# Patient Record
Sex: FEMALE | Race: WHITE
[De-identification: names, ages, dates, MRNs, and addresses within clinical notes are randomized per-mention and may not be internally consistent; named-entity substitution may affect disease eponyms.]

---

## 2017-12-29 ENCOUNTER — HOSPITAL ENCOUNTER (EMERGENCY)
Age: 47
Discharge: HOME | End: 2017-12-29
Payer: COMMERCIAL

## 2017-12-29 DIAGNOSIS — Z86.69: ICD-10-CM

## 2017-12-29 DIAGNOSIS — R51: Primary | ICD-10-CM

## 2017-12-29 PROCEDURE — 96372 THER/PROPH/DIAG INJ SC/IM: CPT

## 2017-12-29 PROCEDURE — 70450 CT HEAD/BRAIN W/O DYE: CPT

## 2017-12-29 PROCEDURE — 99284 EMERGENCY DEPT VISIT MOD MDM: CPT

## 2018-01-13 ENCOUNTER — HOSPITAL ENCOUNTER (OUTPATIENT)
Age: 48
End: 2018-01-13
Payer: COMMERCIAL

## 2018-01-13 DIAGNOSIS — R63.1: ICD-10-CM

## 2018-01-13 DIAGNOSIS — R23.2: Primary | ICD-10-CM

## 2018-01-13 LAB
HBA1C MFR BLD: 6 % (ref 0–7)
TSH SERPL-ACNC: 1.58 UIU/ML (ref 0.36–3.74)

## 2018-01-13 PROCEDURE — 84443 ASSAY THYROID STIM HORMONE: CPT

## 2018-01-13 PROCEDURE — 36415 COLL VENOUS BLD VENIPUNCTURE: CPT

## 2018-01-13 PROCEDURE — 83036 HEMOGLOBIN GLYCOSYLATED A1C: CPT

## 2018-01-13 PROCEDURE — 83002 ASSAY OF GONADOTROPIN (LH): CPT

## 2018-01-13 PROCEDURE — 83001 ASSAY OF GONADOTROPIN (FSH): CPT

## 2018-01-14 LAB
FSH: 85.2 MIU/ML
LH: 74.8 MIU/ML

## 2018-07-03 ENCOUNTER — HOSPITAL ENCOUNTER (OUTPATIENT)
Age: 48
End: 2018-07-03
Payer: COMMERCIAL

## 2018-07-03 DIAGNOSIS — R13.14: ICD-10-CM

## 2018-07-03 DIAGNOSIS — K21.9: Primary | ICD-10-CM

## 2018-07-03 PROCEDURE — 74241: CPT

## 2018-09-27 ENCOUNTER — HOSPITAL ENCOUNTER (OUTPATIENT)
Age: 48
End: 2018-09-27
Payer: COMMERCIAL

## 2018-09-27 ENCOUNTER — HOSPITAL ENCOUNTER (OUTPATIENT)
Dept: HOSPITAL 22 - PT | Age: 48
LOS: 1 days | Discharge: HOME | End: 2018-09-28
Payer: COMMERCIAL

## 2018-09-27 DIAGNOSIS — S82.91XA: Primary | ICD-10-CM

## 2018-09-27 DIAGNOSIS — S99.929A: Primary | ICD-10-CM

## 2018-09-27 PROCEDURE — 97760 ORTHOTIC MGMT&TRAING 1ST ENC: CPT

## 2018-09-27 PROCEDURE — 73630 X-RAY EXAM OF FOOT: CPT

## 2018-10-04 ENCOUNTER — HOSPITAL ENCOUNTER (OUTPATIENT)
Age: 48
End: 2018-10-04
Payer: COMMERCIAL

## 2018-10-04 DIAGNOSIS — S82.61XA: Primary | ICD-10-CM

## 2018-10-04 DIAGNOSIS — S82.839A: ICD-10-CM

## 2018-10-04 PROCEDURE — 73630 X-RAY EXAM OF FOOT: CPT

## 2018-10-12 ENCOUNTER — HOSPITAL ENCOUNTER (OUTPATIENT)
Age: 48
End: 2018-10-12
Payer: COMMERCIAL

## 2018-10-12 DIAGNOSIS — S82.839A: ICD-10-CM

## 2018-10-12 DIAGNOSIS — S82.61XA: Primary | ICD-10-CM

## 2018-10-12 PROCEDURE — 73610 X-RAY EXAM OF ANKLE: CPT

## 2018-10-20 ENCOUNTER — HOSPITAL ENCOUNTER (OUTPATIENT)
Age: 48
End: 2018-10-20
Payer: COMMERCIAL

## 2018-10-20 DIAGNOSIS — E53.8: ICD-10-CM

## 2018-10-20 DIAGNOSIS — E55.9: ICD-10-CM

## 2018-10-20 DIAGNOSIS — R73.9: Primary | ICD-10-CM

## 2018-10-20 DIAGNOSIS — R53.83: ICD-10-CM

## 2018-10-20 LAB
ALBUMIN LEVEL: 3.4 GM/DL (ref 3.4–5)
ALBUMIN/GLOB SERPL: 1 {RATIO} (ref 1.1–1.8)
ALP ISO SERPL-ACNC: 121 U/L (ref 46–116)
ALT SERPLBLD-CCNC: 21 U/L (ref 12–78)
ANION GAP SERPL CALC-SCNC: 10.4 MEQ/L (ref 5–15)
AST SERPL QL: 15 U/L (ref 15–37)
BILIRUBIN,TOTAL: 0.4 MG/DL (ref 0.2–1)
BUN SERPL-MCNC: 18 MG/DL (ref 7–18)
CALCIUM SPEC-MCNC: 8.9 MG/DL (ref 8.5–10.1)
CHLORIDE SPEC-SCNC: 104 MMOL/L (ref 98–107)
CHOLEST SPEC-SCNC: 245 MG/DL (ref 140–200)
CO2 SERPL-SCNC: 29 MMOL/L (ref 21–32)
CREAT BLD-SCNC: 0.67 MG/DL (ref 0.55–1.02)
ESTIMATED GLOMERULAR FILT RATE: 95 ML/MIN (ref 60–?)
FT4I SERPL CALC-MCNC: 3.2 UG/DL (ref 5.93–13.13)
GFR (AFRICAN AMERICAN): 115 ML/MIN (ref 60–?)
GLOBULIN SER CALC-MCNC: 3.4 GM/DL (ref 1.3–3.2)
GLUCOSE: 88 MG/DL (ref 74–106)
HBA1C MFR BLD: 6 % (ref 0–7)
HCT VFR BLD CALC: 36.3 % (ref 37–47)
HDLC SERPL-MCNC: 76 MG/DL (ref 29–89)
HGB BLD-MCNC: 12.6 G/DL (ref 12.2–16.2)
MCHC RBC-ENTMCNC: 34.7 G/DL (ref 31.8–35.4)
MCV RBC: 92.4 FL (ref 81–99)
MEAN CORPUSCULAR HEMOGLOBIN: 32.1 PG (ref 27–31.2)
PLATELET # BLD: 357 K/MM3 (ref 142–424)
POTASSIUM: 4.4 MMOL/L (ref 3.5–5.1)
PROT SERPL-MCNC: 6.8 GM/DL (ref 6.4–8.2)
RBC # BLD AUTO: 3.93 M/MM3 (ref 4.2–5.4)
SODIUM SPEC-SCNC: 139 MMOL/L (ref 136–145)
T3RU NFR SERPL: 32 % (ref 31–39)
T4 (THYROXINE): 10 UG/DL (ref 4.7–13.3)
TRIGLYCERIDES: 54 MG/DL (ref 30–200)
TSH SERPL-ACNC: 2.62 UIU/ML (ref 0.36–3.74)
WBC # BLD AUTO: 7.2 K/MM3 (ref 4.8–10.8)

## 2018-10-20 PROCEDURE — 80061 LIPID PANEL: CPT

## 2018-10-20 PROCEDURE — 82607 VITAMIN B-12: CPT

## 2018-10-20 PROCEDURE — 82652 VIT D 1 25-DIHYDROXY: CPT

## 2018-10-20 PROCEDURE — 83036 HEMOGLOBIN GLYCOSYLATED A1C: CPT

## 2018-10-20 PROCEDURE — 84479 ASSAY OF THYROID (T3 OR T4): CPT

## 2018-10-20 PROCEDURE — 84443 ASSAY THYROID STIM HORMONE: CPT

## 2018-10-20 PROCEDURE — 84436 ASSAY OF TOTAL THYROXINE: CPT

## 2018-10-20 PROCEDURE — 36415 COLL VENOUS BLD VENIPUNCTURE: CPT

## 2018-10-20 PROCEDURE — 85025 COMPLETE CBC W/AUTO DIFF WBC: CPT

## 2018-10-20 PROCEDURE — 80053 COMPREHEN METABOLIC PANEL: CPT

## 2018-10-22 LAB — VITAMIN B12: 1052 PG/ML (ref 232–1245)

## 2018-10-25 ENCOUNTER — HOSPITAL ENCOUNTER (OUTPATIENT)
Age: 48
End: 2018-10-25
Payer: COMMERCIAL

## 2018-10-25 DIAGNOSIS — R13.10: Primary | ICD-10-CM

## 2018-10-25 PROCEDURE — 74220 X-RAY XM ESOPHAGUS 1CNTRST: CPT

## 2018-11-02 ENCOUNTER — HOSPITAL ENCOUNTER (OUTPATIENT)
Age: 48
End: 2018-11-02
Payer: COMMERCIAL

## 2018-11-02 DIAGNOSIS — S82.61XA: Primary | ICD-10-CM

## 2018-11-02 PROCEDURE — 73610 X-RAY EXAM OF ANKLE: CPT

## 2018-11-30 ENCOUNTER — HOSPITAL ENCOUNTER (OUTPATIENT)
Age: 48
End: 2018-11-30
Payer: COMMERCIAL

## 2018-11-30 DIAGNOSIS — S82.61XA: Primary | ICD-10-CM

## 2018-11-30 PROCEDURE — 73610 X-RAY EXAM OF ANKLE: CPT

## 2018-12-17 ENCOUNTER — HOSPITAL ENCOUNTER (OUTPATIENT)
Dept: HOSPITAL 22 - PT | Age: 48
Discharge: HOME | End: 2018-12-17
Payer: COMMERCIAL

## 2018-12-17 DIAGNOSIS — S82.61XA: Primary | ICD-10-CM

## 2018-12-17 PROCEDURE — 97016 VASOPNEUMATIC DEVICE THERAPY: CPT

## 2018-12-17 PROCEDURE — 97110 THERAPEUTIC EXERCISES: CPT

## 2018-12-17 PROCEDURE — 97112 NEUROMUSCULAR REEDUCATION: CPT

## 2018-12-17 PROCEDURE — 97164 PT RE-EVAL EST PLAN CARE: CPT

## 2018-12-17 PROCEDURE — 97033 APP MDLTY 1+IONTPHRSIS EA 15: CPT

## 2018-12-17 PROCEDURE — 97010 HOT OR COLD PACKS THERAPY: CPT

## 2018-12-17 PROCEDURE — 97163 PT EVAL HIGH COMPLEX 45 MIN: CPT

## 2018-12-17 PROCEDURE — G0283 ELEC STIM OTHER THAN WOUND: HCPCS

## 2018-12-17 PROCEDURE — 97140 MANUAL THERAPY 1/> REGIONS: CPT

## 2018-12-17 PROCEDURE — 97014 ELECTRIC STIMULATION THERAPY: CPT

## 2019-06-03 ENCOUNTER — HOSPITAL ENCOUNTER (OUTPATIENT)
Age: 49
End: 2019-06-03
Payer: COMMERCIAL

## 2019-06-03 DIAGNOSIS — E55.9: ICD-10-CM

## 2019-06-03 DIAGNOSIS — Z00.00: Primary | ICD-10-CM

## 2019-06-03 DIAGNOSIS — E53.8: ICD-10-CM

## 2019-06-03 LAB
ALBUMIN LEVEL: 3.4 GM/DL (ref 3.4–5)
ALBUMIN/GLOB SERPL: 1.1 {RATIO} (ref 1.1–1.8)
ALP ISO SERPL-ACNC: 118 U/L (ref 46–116)
ALT SERPLBLD-CCNC: 27 U/L (ref 12–78)
ANION GAP SERPL CALC-SCNC: 16 MEQ/L (ref 5–15)
AST SERPL QL: 14 U/L (ref 15–37)
BILIRUBIN,TOTAL: 0.2 MG/DL (ref 0.2–1)
BUN SERPL-MCNC: 20 MG/DL (ref 7–18)
CALCIUM SPEC-MCNC: 9 MG/DL (ref 8.5–10.1)
CHLORIDE SPEC-SCNC: 104 MMOL/L (ref 98–107)
CHOLEST SPEC-SCNC: 188 MG/DL (ref 140–200)
CO2 SERPL-SCNC: 25 MMOL/L (ref 21–32)
CREAT BLD-SCNC: 0.63 MG/DL (ref 0.55–1.02)
ESTIMATED GLOMERULAR FILT RATE: 101 ML/MIN (ref 60–?)
GFR (AFRICAN AMERICAN): 123 ML/MIN (ref 60–?)
GLOBULIN SER CALC-MCNC: 3.1 GM/DL (ref 1.3–3.2)
GLUCOSE: 93 MG/DL (ref 74–106)
HCT VFR BLD CALC: 37.7 % (ref 37–47)
HDLC SERPL-MCNC: 50 MG/DL (ref 29–89)
HGB BLD-MCNC: 12.5 G/DL (ref 12.2–16.2)
MCHC RBC-ENTMCNC: 33.2 G/DL (ref 31.8–35.4)
MCV RBC: 87.1 FL (ref 81–99)
MEAN CORPUSCULAR HEMOGLOBIN: 28.9 PG (ref 27–31.2)
PLATELET # BLD: 343 K/MM3 (ref 142–424)
POTASSIUM: 4 MMOL/L (ref 3.5–5.1)
PROT SERPL-MCNC: 6.5 GM/DL (ref 6.4–8.2)
RBC # BLD AUTO: 4.33 M/MM3 (ref 4.2–5.4)
SODIUM SPEC-SCNC: 141 MMOL/L (ref 136–145)
TRIGLYCERIDES: 63 MG/DL (ref 30–200)
WBC # BLD AUTO: 8.2 K/MM3 (ref 4.8–10.8)

## 2019-06-03 PROCEDURE — 85025 COMPLETE CBC W/AUTO DIFF WBC: CPT

## 2019-06-03 PROCEDURE — 80061 LIPID PANEL: CPT

## 2019-06-03 PROCEDURE — 82607 VITAMIN B-12: CPT

## 2019-06-03 PROCEDURE — 80053 COMPREHEN METABOLIC PANEL: CPT

## 2019-06-03 PROCEDURE — 82652 VIT D 1 25-DIHYDROXY: CPT

## 2019-06-07 LAB — VITAMIN B12: 582 PG/ML (ref 232–1245)

## 2019-12-12 ENCOUNTER — HOSPITAL ENCOUNTER (OUTPATIENT)
Dept: HOSPITAL 22 - PT | Age: 49
Discharge: HOME | End: 2019-12-12
Payer: COMMERCIAL

## 2019-12-12 DIAGNOSIS — M84.375D: Primary | ICD-10-CM

## 2019-12-12 PROCEDURE — 97760 ORTHOTIC MGMT&TRAING 1ST ENC: CPT

## 2019-12-20 ENCOUNTER — HOSPITAL ENCOUNTER (OUTPATIENT)
Age: 49
End: 2019-12-20
Payer: COMMERCIAL

## 2019-12-20 DIAGNOSIS — M84.375D: Primary | ICD-10-CM

## 2019-12-20 PROCEDURE — 73718 MRI LOWER EXTREMITY W/O DYE: CPT

## 2020-01-06 ENCOUNTER — HOSPITAL ENCOUNTER (OUTPATIENT)
Age: 50
End: 2020-01-06
Payer: COMMERCIAL

## 2020-01-06 DIAGNOSIS — M84.375G: ICD-10-CM

## 2020-01-06 DIAGNOSIS — S99.192A: Primary | ICD-10-CM

## 2020-01-06 DIAGNOSIS — Z01.818: ICD-10-CM

## 2020-01-06 LAB
ALBUMIN LEVEL: 3.8 GM/DL (ref 3.4–5)
ALBUMIN/GLOB SERPL: 1.2 {RATIO} (ref 1.1–1.8)
ALP ISO SERPL-ACNC: 151 U/L (ref 46–116)
ALT SERPLBLD-CCNC: 18 U/L (ref 12–78)
ANION GAP SERPL CALC-SCNC: 14.2 MEQ/L (ref 5–15)
AST SERPL QL: 11 U/L (ref 15–37)
BILIRUBIN,TOTAL: 0.1 MG/DL (ref 0.2–1)
BUN SERPL-MCNC: 18 MG/DL (ref 7–18)
CALCIUM SPEC-MCNC: 9 MG/DL (ref 8.5–10.1)
CHLORIDE SPEC-SCNC: 104 MMOL/L (ref 98–107)
CO2 SERPL-SCNC: 29 MMOL/L (ref 21–32)
CREAT BLD-SCNC: 0.89 MG/DL (ref 0.55–1.02)
ESTIMATED GLOMERULAR FILT RATE: 68 ML/MIN (ref 60–?)
GFR (AFRICAN AMERICAN): 82 ML/MIN (ref 60–?)
GLOBULIN SER CALC-MCNC: 3.3 GM/DL (ref 1.3–3.2)
GLUCOSE: 91 MG/DL (ref 74–106)
HCT VFR BLD CALC: 43.5 % (ref 37–47)
HGB BLD-MCNC: 13.4 G/DL (ref 12.2–16.2)
MCHC RBC-ENTMCNC: 30.8 G/DL (ref 31.8–35.4)
MCV RBC: 91.4 FL (ref 81–99)
MEAN CORPUSCULAR HEMOGLOBIN: 28.1 PG (ref 27–31.2)
PLATELET # BLD: 436 K/MM3 (ref 142–424)
POTASSIUM: 4.2 MMOL/L (ref 3.5–5.1)
PROT SERPL-MCNC: 7.1 GM/DL (ref 6.4–8.2)
RBC # BLD AUTO: 4.76 M/MM3 (ref 4.2–5.4)
SODIUM SPEC-SCNC: 143 MMOL/L (ref 136–145)
WBC # BLD AUTO: 10.7 K/MM3 (ref 4.8–10.8)

## 2020-01-06 PROCEDURE — 80053 COMPREHEN METABOLIC PANEL: CPT

## 2020-01-06 PROCEDURE — 71046 X-RAY EXAM CHEST 2 VIEWS: CPT

## 2020-01-06 PROCEDURE — 82652 VIT D 1 25-DIHYDROXY: CPT

## 2020-01-06 PROCEDURE — 85025 COMPLETE CBC W/AUTO DIFF WBC: CPT

## 2020-01-06 PROCEDURE — 36415 COLL VENOUS BLD VENIPUNCTURE: CPT

## 2020-01-06 PROCEDURE — 93005 ELECTROCARDIOGRAM TRACING: CPT

## 2020-01-21 ENCOUNTER — HOSPITAL ENCOUNTER (OUTPATIENT)
Age: 50
End: 2020-01-21
Payer: COMMERCIAL

## 2020-01-21 DIAGNOSIS — M25.371: ICD-10-CM

## 2020-01-21 DIAGNOSIS — Z98.890: Primary | ICD-10-CM

## 2020-01-21 DIAGNOSIS — M25.571: ICD-10-CM

## 2020-01-21 PROCEDURE — 73610 X-RAY EXAM OF ANKLE: CPT

## 2020-01-21 PROCEDURE — 73630 X-RAY EXAM OF FOOT: CPT

## 2020-02-06 ENCOUNTER — HOSPITAL ENCOUNTER (OUTPATIENT)
Age: 50
End: 2020-02-06
Payer: COMMERCIAL

## 2020-02-06 DIAGNOSIS — Z98.890: Primary | ICD-10-CM

## 2020-02-06 DIAGNOSIS — S99.192A: ICD-10-CM

## 2020-02-06 DIAGNOSIS — M84.375G: ICD-10-CM

## 2020-02-06 PROCEDURE — 73630 X-RAY EXAM OF FOOT: CPT

## 2020-02-24 ENCOUNTER — HOSPITAL ENCOUNTER (OUTPATIENT)
Age: 50
End: 2020-02-24
Payer: COMMERCIAL

## 2020-02-24 DIAGNOSIS — Z98.890: Primary | ICD-10-CM

## 2020-02-24 DIAGNOSIS — S99.192G: ICD-10-CM

## 2020-02-24 PROCEDURE — 73610 X-RAY EXAM OF ANKLE: CPT

## 2020-06-18 ENCOUNTER — HOSPITAL ENCOUNTER (OUTPATIENT)
Age: 50
End: 2020-06-18
Payer: COMMERCIAL

## 2020-06-18 DIAGNOSIS — Z01.818: Primary | ICD-10-CM

## 2020-06-18 PROCEDURE — 86328 IA NFCT AB SARSCOV2 COVID19: CPT

## 2020-06-18 PROCEDURE — 36415 COLL VENOUS BLD VENIPUNCTURE: CPT

## 2020-06-19 ENCOUNTER — ON CAMPUS - OUTPATIENT (OUTPATIENT)
Dept: RURAL HOSPITAL 6 | Facility: HOSPITAL | Age: 50
End: 2020-06-19
Payer: COMMERCIAL

## 2020-06-19 ENCOUNTER — HOSPITAL ENCOUNTER (OUTPATIENT)
Dept: HOSPITAL 22 - OUTP | Age: 50
Discharge: HOME | End: 2020-06-19
Payer: COMMERCIAL

## 2020-06-19 VITALS
SYSTOLIC BLOOD PRESSURE: 132 MMHG | DIASTOLIC BLOOD PRESSURE: 79 MMHG | OXYGEN SATURATION: 99 % | TEMPERATURE: 97.16 F | RESPIRATION RATE: 18 BRPM | HEART RATE: 67 BPM

## 2020-06-19 VITALS
SYSTOLIC BLOOD PRESSURE: 133 MMHG | DIASTOLIC BLOOD PRESSURE: 87 MMHG | HEART RATE: 71 BPM | TEMPERATURE: 97.1 F | OXYGEN SATURATION: 98 % | RESPIRATION RATE: 18 BRPM

## 2020-06-19 VITALS
DIASTOLIC BLOOD PRESSURE: 73 MMHG | OXYGEN SATURATION: 99 % | SYSTOLIC BLOOD PRESSURE: 118 MMHG | HEART RATE: 64 BPM | TEMPERATURE: 97.16 F | RESPIRATION RATE: 18 BRPM

## 2020-06-19 VITALS
SYSTOLIC BLOOD PRESSURE: 154 MMHG | RESPIRATION RATE: 18 BRPM | TEMPERATURE: 97.1 F | OXYGEN SATURATION: 97 % | DIASTOLIC BLOOD PRESSURE: 95 MMHG | HEART RATE: 88 BPM

## 2020-06-19 VITALS
TEMPERATURE: 97.52 F | DIASTOLIC BLOOD PRESSURE: 73 MMHG | OXYGEN SATURATION: 100 % | HEART RATE: 55 BPM | RESPIRATION RATE: 18 BRPM | SYSTOLIC BLOOD PRESSURE: 131 MMHG

## 2020-06-19 VITALS — BODY MASS INDEX: 31.3 KG/M2

## 2020-06-19 VITALS
RESPIRATION RATE: 18 BRPM | TEMPERATURE: 97.1 F | DIASTOLIC BLOOD PRESSURE: 79 MMHG | OXYGEN SATURATION: 97 % | SYSTOLIC BLOOD PRESSURE: 112 MMHG | HEART RATE: 61 BPM

## 2020-06-19 VITALS — RESPIRATION RATE: 18 BRPM

## 2020-06-19 VITALS — OXYGEN SATURATION: 97 %

## 2020-06-19 DIAGNOSIS — K57.30 DIVERTICULOSIS OF LARGE INTESTINE WITHOUT PERFORATION OR ABS: ICD-10-CM

## 2020-06-19 DIAGNOSIS — J44.9: ICD-10-CM

## 2020-06-19 DIAGNOSIS — F32.9: ICD-10-CM

## 2020-06-19 DIAGNOSIS — Z83.438: ICD-10-CM

## 2020-06-19 DIAGNOSIS — G43.909: ICD-10-CM

## 2020-06-19 DIAGNOSIS — K64.2 THIRD DEGREE HEMORRHOIDS: ICD-10-CM

## 2020-06-19 DIAGNOSIS — K57.30: Primary | ICD-10-CM

## 2020-06-19 DIAGNOSIS — Z83.3: ICD-10-CM

## 2020-06-19 DIAGNOSIS — Z82.3: ICD-10-CM

## 2020-06-19 DIAGNOSIS — Z82.49: ICD-10-CM

## 2020-06-19 DIAGNOSIS — K62.5 HEMORRHAGE OF ANUS AND RECTUM: ICD-10-CM

## 2020-06-19 DIAGNOSIS — Z90.710: ICD-10-CM

## 2020-06-19 DIAGNOSIS — K64.1: ICD-10-CM

## 2020-06-19 DIAGNOSIS — Z90.89: ICD-10-CM

## 2020-06-19 DIAGNOSIS — K21.9: ICD-10-CM

## 2020-06-19 PROCEDURE — 0DJD8ZZ INSPECTION OF LOWER INTESTINAL TRACT, VIA NATURAL OR ARTIFICIAL OPENING ENDOSCOPIC: ICD-10-PCS | Performed by: INTERNAL MEDICINE

## 2020-06-19 PROCEDURE — 45398 COLONOSCOPY W/BAND LIGATION: CPT | Performed by: INTERNAL MEDICINE

## 2020-06-19 PROCEDURE — 45398 COLONOSCOPY W/BAND LIGATION: CPT

## 2020-09-01 ENCOUNTER — HOSPITAL ENCOUNTER (OUTPATIENT)
Age: 50
End: 2020-09-01
Payer: COMMERCIAL

## 2020-09-01 DIAGNOSIS — Z12.31: Primary | ICD-10-CM

## 2020-09-01 PROCEDURE — 77067 SCR MAMMO BI INCL CAD: CPT

## 2020-09-01 PROCEDURE — 77063 BREAST TOMOSYNTHESIS BI: CPT

## 2020-09-26 ENCOUNTER — HOSPITAL ENCOUNTER (OUTPATIENT)
Age: 50
End: 2020-09-26
Payer: COMMERCIAL

## 2020-09-26 DIAGNOSIS — Z01.89: Primary | ICD-10-CM

## 2020-09-26 DIAGNOSIS — Z13.810: ICD-10-CM

## 2020-09-26 PROCEDURE — 86328 IA NFCT AB SARSCOV2 COVID19: CPT

## 2020-09-26 PROCEDURE — 36415 COLL VENOUS BLD VENIPUNCTURE: CPT

## 2020-09-28 ENCOUNTER — HOSPITAL ENCOUNTER (OUTPATIENT)
Dept: HOSPITAL 22 - OUTP | Age: 50
Discharge: HOME | End: 2020-09-28
Payer: COMMERCIAL

## 2020-09-28 ENCOUNTER — ON CAMPUS - OUTPATIENT (OUTPATIENT)
Dept: RURAL HOSPITAL 6 | Facility: HOSPITAL | Age: 50
End: 2020-09-28
Payer: COMMERCIAL

## 2020-09-28 VITALS
RESPIRATION RATE: 16 BRPM | SYSTOLIC BLOOD PRESSURE: 133 MMHG | HEART RATE: 78 BPM | DIASTOLIC BLOOD PRESSURE: 80 MMHG | OXYGEN SATURATION: 96 % | TEMPERATURE: 98.78 F

## 2020-09-28 VITALS
DIASTOLIC BLOOD PRESSURE: 80 MMHG | OXYGEN SATURATION: 99 % | SYSTOLIC BLOOD PRESSURE: 130 MMHG | RESPIRATION RATE: 16 BRPM | HEART RATE: 69 BPM | TEMPERATURE: 98.78 F

## 2020-09-28 VITALS — OXYGEN SATURATION: 94 %

## 2020-09-28 VITALS
SYSTOLIC BLOOD PRESSURE: 139 MMHG | RESPIRATION RATE: 16 BRPM | HEART RATE: 61 BPM | DIASTOLIC BLOOD PRESSURE: 88 MMHG | OXYGEN SATURATION: 100 %

## 2020-09-28 VITALS
OXYGEN SATURATION: 94 % | HEART RATE: 56 BPM | SYSTOLIC BLOOD PRESSURE: 121 MMHG | RESPIRATION RATE: 18 BRPM | TEMPERATURE: 97.2 F | DIASTOLIC BLOOD PRESSURE: 75 MMHG

## 2020-09-28 VITALS
DIASTOLIC BLOOD PRESSURE: 94 MMHG | SYSTOLIC BLOOD PRESSURE: 137 MMHG | OXYGEN SATURATION: 97 % | RESPIRATION RATE: 16 BRPM | HEART RATE: 59 BPM

## 2020-09-28 VITALS — BODY MASS INDEX: 32.5 KG/M2

## 2020-09-28 DIAGNOSIS — Z90.89: ICD-10-CM

## 2020-09-28 DIAGNOSIS — K30 FUNCTIONAL DYSPEPSIA: ICD-10-CM

## 2020-09-28 DIAGNOSIS — Z82.49: ICD-10-CM

## 2020-09-28 DIAGNOSIS — K21.9: ICD-10-CM

## 2020-09-28 DIAGNOSIS — R13.10 DYSPHAGIA, UNSPECIFIED: ICD-10-CM

## 2020-09-28 DIAGNOSIS — K22.4: ICD-10-CM

## 2020-09-28 DIAGNOSIS — R10.13 EPIGASTRIC PAIN: ICD-10-CM

## 2020-09-28 DIAGNOSIS — F45.8 OTHER SOMATOFORM DISORDERS: ICD-10-CM

## 2020-09-28 DIAGNOSIS — J44.9: ICD-10-CM

## 2020-09-28 DIAGNOSIS — K29.50 UNSPECIFIED CHRONIC GASTRITIS WITHOUT BLEEDING: ICD-10-CM

## 2020-09-28 DIAGNOSIS — Z82.3: ICD-10-CM

## 2020-09-28 DIAGNOSIS — K31.9: ICD-10-CM

## 2020-09-28 DIAGNOSIS — F32.9: ICD-10-CM

## 2020-09-28 DIAGNOSIS — Z83.438: ICD-10-CM

## 2020-09-28 DIAGNOSIS — K22.4 DYSKINESIA OF ESOPHAGUS: ICD-10-CM

## 2020-09-28 DIAGNOSIS — Z90.710: ICD-10-CM

## 2020-09-28 DIAGNOSIS — J39.2: Primary | ICD-10-CM

## 2020-09-28 DIAGNOSIS — K21.9 GASTRO-ESOPHAGEAL REFLUX DISEASE WITHOUT ESOPHAGITIS: ICD-10-CM

## 2020-09-28 DIAGNOSIS — G43.909: ICD-10-CM

## 2020-09-28 PROCEDURE — C1726 CATH, BAL DIL, NON-VASCULAR: HCPCS

## 2020-09-28 PROCEDURE — 43239 EGD BIOPSY SINGLE/MULTIPLE: CPT | Mod: 59 | Performed by: INTERNAL MEDICINE

## 2020-09-28 PROCEDURE — 0DJ08ZZ INSPECTION OF UPPER INTESTINAL TRACT, VIA NATURAL OR ARTIFICIAL OPENING ENDOSCOPIC: ICD-10-PCS | Performed by: INTERNAL MEDICINE

## 2020-09-28 PROCEDURE — 43249 ESOPH EGD DILATION <30 MM: CPT

## 2020-09-28 PROCEDURE — 43249 ESOPH EGD DILATION <30 MM: CPT | Performed by: INTERNAL MEDICINE

## 2020-09-28 PROCEDURE — 43239 EGD BIOPSY SINGLE/MULTIPLE: CPT

## 2020-11-19 ENCOUNTER — HOSPITAL ENCOUNTER (OUTPATIENT)
Age: 50
End: 2020-11-19
Payer: COMMERCIAL

## 2020-11-19 DIAGNOSIS — E53.8: ICD-10-CM

## 2020-11-19 DIAGNOSIS — R35.8: Primary | ICD-10-CM

## 2020-11-19 DIAGNOSIS — E78.5: ICD-10-CM

## 2020-11-19 DIAGNOSIS — M67.212: ICD-10-CM

## 2020-11-19 LAB
ALBUMIN LEVEL: 4.3 G/DL (ref 3.5–5)
ALBUMIN/GLOB SERPL: 1.6 {RATIO} (ref 1.1–1.8)
ALP ISO SERPL-ACNC: 159 U/L (ref 38–126)
ALT SERPLBLD-CCNC: 27 U/L (ref 12–78)
ANION GAP SERPL CALC-SCNC: 12.4 MEQ/L (ref 5–15)
AST SERPL QL: 27 U/L (ref 14–36)
BILIRUBIN,TOTAL: 0.2 MG/DL (ref 0.2–1.3)
BUN SERPL-MCNC: 18 MG/DL (ref 7–17)
CALCIUM SPEC-MCNC: 9.8 MG/DL (ref 8.4–10.2)
CHLORIDE SPEC-SCNC: 106 MMOL/L (ref 98–107)
CHOLEST SPEC-SCNC: 210 MG/DL (ref 140–200)
CO2 SERPL-SCNC: 26 MMOL/L (ref 22–30)
CREAT BLD-SCNC: 1 MG/DL (ref 0.52–1.04)
ESTIMATED GLOMERULAR FILT RATE: 59 ML/MIN (ref 60–?)
GFR (AFRICAN AMERICAN): 72 ML/MIN (ref 60–?)
GLOBULIN SER CALC-MCNC: 2.7 G/DL (ref 1.3–3.2)
GLUCOSE: 107 MG/DL (ref 74–100)
HBA1C MFR BLD: 6 % (ref 4–6)
HCT VFR BLD CALC: 42.9 % (ref 37–47)
HDLC SERPL-MCNC: 50 MG/DL (ref 40–60)
HGB BLD-MCNC: 13.8 G/DL (ref 12.2–16.2)
MCHC RBC-ENTMCNC: 32.1 G/DL (ref 31.8–35.4)
MCV RBC: 89.7 FL (ref 81–99)
MEAN CORPUSCULAR HEMOGLOBIN: 28.8 PG (ref 27–31.2)
PLATELET # BLD: 365 K/MM3 (ref 142–424)
POTASSIUM: 4.4 MMOL/L (ref 3.5–5.1)
PROT SERPL-MCNC: 7 G/DL (ref 6.3–8.2)
RBC # BLD AUTO: 4.79 M/MM3 (ref 4.2–5.4)
SODIUM SPEC-SCNC: 140 MMOL/L (ref 136–145)
TRIGLYCERIDES: 103 MG/DL (ref 30–150)
TSH SERPL-ACNC: 2.69 UIU/ML (ref 0.47–4.68)
VITAMIN B12: 730 PG/ML (ref 239–931)
WBC # BLD AUTO: 10.2 K/MM3 (ref 4.8–10.8)

## 2020-11-19 PROCEDURE — 80061 LIPID PANEL: CPT

## 2020-11-19 PROCEDURE — 73030 X-RAY EXAM OF SHOULDER: CPT

## 2020-11-19 PROCEDURE — 82607 VITAMIN B-12: CPT

## 2020-11-19 PROCEDURE — 80053 COMPREHEN METABOLIC PANEL: CPT

## 2020-11-19 PROCEDURE — 84443 ASSAY THYROID STIM HORMONE: CPT

## 2020-11-19 PROCEDURE — 83036 HEMOGLOBIN GLYCOSYLATED A1C: CPT

## 2020-11-19 PROCEDURE — 36415 COLL VENOUS BLD VENIPUNCTURE: CPT

## 2020-11-19 PROCEDURE — 85025 COMPLETE CBC W/AUTO DIFF WBC: CPT

## 2020-12-29 ENCOUNTER — HOSPITAL ENCOUNTER (OUTPATIENT)
Age: 50
End: 2020-12-29
Payer: COMMERCIAL

## 2020-12-29 DIAGNOSIS — M79.672: Primary | ICD-10-CM

## 2020-12-29 PROCEDURE — 73630 X-RAY EXAM OF FOOT: CPT

## 2021-01-19 ENCOUNTER — HOSPITAL ENCOUNTER (OUTPATIENT)
Age: 51
End: 2021-01-19
Payer: COMMERCIAL

## 2021-01-19 DIAGNOSIS — M84.375A: Primary | ICD-10-CM

## 2021-01-19 DIAGNOSIS — S99.192A: ICD-10-CM

## 2021-01-19 PROCEDURE — 73700 CT LOWER EXTREMITY W/O DYE: CPT

## 2021-02-22 ENCOUNTER — HOSPITAL ENCOUNTER (OUTPATIENT)
Age: 51
End: 2021-02-22
Payer: COMMERCIAL

## 2021-02-22 DIAGNOSIS — E78.5: Primary | ICD-10-CM

## 2021-02-22 DIAGNOSIS — E55.9: ICD-10-CM

## 2021-02-22 DIAGNOSIS — E53.8: ICD-10-CM

## 2021-02-22 LAB
25-OH VITAMIN D, TOTAL: 37.4 NG/ML (ref 30–100)
ALBUMIN LEVEL: 4.7 G/DL (ref 3.5–5)
ALBUMIN/GLOB SERPL: 1.5 {RATIO} (ref 1.1–1.8)
ALP ISO SERPL-ACNC: 120 U/L (ref 38–126)
ALT SERPLBLD-CCNC: 12 U/L (ref 12–78)
ANION GAP SERPL CALC-SCNC: 11.1 MEQ/L (ref 5–15)
AST SERPL QL: 19 U/L (ref 14–36)
BILIRUBIN,TOTAL: 0.4 MG/DL (ref 0.2–1.3)
BUN SERPL-MCNC: 19 MG/DL (ref 7–17)
CALCIUM SPEC-MCNC: 10.3 MG/DL (ref 8.4–10.2)
CHLORIDE SPEC-SCNC: 107 MMOL/L (ref 98–107)
CHOLEST SPEC-SCNC: 235 MG/DL (ref 140–200)
CO2 SERPL-SCNC: 27 MMOL/L (ref 22–30)
CREAT BLD-SCNC: 0.7 MG/DL (ref 0.52–1.04)
ESTIMATED GLOMERULAR FILT RATE: 89 ML/MIN (ref 60–?)
GFR (AFRICAN AMERICAN): 108 ML/MIN (ref 60–?)
GLOBULIN SER CALC-MCNC: 3.1 G/DL (ref 1.3–3.2)
GLUCOSE: 98 MG/DL (ref 74–100)
HCT VFR BLD CALC: 43.8 % (ref 37–47)
HDLC SERPL-MCNC: 50 MG/DL (ref 40–60)
HGB BLD-MCNC: 14 G/DL (ref 12.2–16.2)
MCHC RBC-ENTMCNC: 31.9 G/DL (ref 31.8–35.4)
MCV RBC: 90.5 FL (ref 81–99)
MEAN CORPUSCULAR HEMOGLOBIN: 28.9 PG (ref 27–31.2)
PLATELET # BLD: 370 K/MM3 (ref 142–424)
POTASSIUM: 4.1 MMOL/L (ref 3.5–5.1)
PROT SERPL-MCNC: 7.8 G/DL (ref 6.3–8.2)
RBC # BLD AUTO: 4.84 M/MM3 (ref 4.2–5.4)
SODIUM SPEC-SCNC: 141 MMOL/L (ref 136–145)
TRIGLYCERIDES: 100 MG/DL (ref 30–150)
TSH SERPL-ACNC: 2.18 UIU/ML (ref 0.47–4.68)
VITAMIN B12: 913 PG/ML (ref 239–931)
WBC # BLD AUTO: 14.3 K/MM3 (ref 4.8–10.8)

## 2021-02-22 PROCEDURE — 82607 VITAMIN B-12: CPT

## 2021-02-22 PROCEDURE — 36415 COLL VENOUS BLD VENIPUNCTURE: CPT

## 2021-02-22 PROCEDURE — 85025 COMPLETE CBC W/AUTO DIFF WBC: CPT

## 2021-02-22 PROCEDURE — 82306 VITAMIN D 25 HYDROXY: CPT

## 2021-02-22 PROCEDURE — 80053 COMPREHEN METABOLIC PANEL: CPT

## 2021-02-22 PROCEDURE — 84443 ASSAY THYROID STIM HORMONE: CPT

## 2021-02-22 PROCEDURE — 80061 LIPID PANEL: CPT

## 2021-03-02 ENCOUNTER — HOSPITAL ENCOUNTER (OUTPATIENT)
Age: 51
End: 2021-03-02
Payer: COMMERCIAL

## 2021-03-02 DIAGNOSIS — T14.8XXA: ICD-10-CM

## 2021-03-02 DIAGNOSIS — M84.375A: Primary | ICD-10-CM

## 2021-03-02 LAB
ALBUMIN LEVEL: 4.2 G/DL (ref 3.5–5)
ALBUMIN/GLOB SERPL: 1.6 {RATIO} (ref 1.1–1.8)
ALP ISO SERPL-ACNC: 107 U/L (ref 38–126)
ALT SERPLBLD-CCNC: 12 U/L (ref 12–78)
ANION GAP SERPL CALC-SCNC: 8.3 MEQ/L (ref 5–15)
AST SERPL QL: 20 U/L (ref 14–36)
BILIRUBIN,TOTAL: 0.3 MG/DL (ref 0.2–1.3)
BUN SERPL-MCNC: 17 MG/DL (ref 7–17)
CALCIUM SPEC-MCNC: 9.7 MG/DL (ref 8.4–10.2)
CHLORIDE SPEC-SCNC: 107 MMOL/L (ref 98–107)
CO2 SERPL-SCNC: 30 MMOL/L (ref 22–30)
CREAT BLD-SCNC: 0.7 MG/DL (ref 0.52–1.04)
ESTIMATED GLOMERULAR FILT RATE: 89 ML/MIN (ref 60–?)
GFR (AFRICAN AMERICAN): 108 ML/MIN (ref 60–?)
GLOBULIN SER CALC-MCNC: 2.7 G/DL (ref 1.3–3.2)
GLUCOSE: 112 MG/DL (ref 74–100)
HCT VFR BLD CALC: 41.1 % (ref 37–47)
HGB BLD-MCNC: 13.5 G/DL (ref 12.2–16.2)
MCHC RBC-ENTMCNC: 32.9 G/DL (ref 31.8–35.4)
MCV RBC: 89.4 FL (ref 81–99)
MEAN CORPUSCULAR HEMOGLOBIN: 29.4 PG (ref 27–31.2)
PLATELET # BLD: 343 K/MM3 (ref 142–424)
POTASSIUM: 4.3 MMOL/L (ref 3.5–5.1)
PROT SERPL-MCNC: 6.9 G/DL (ref 6.3–8.2)
RBC # BLD AUTO: 4.59 M/MM3 (ref 4.2–5.4)
SODIUM SPEC-SCNC: 141 MMOL/L (ref 136–145)
WBC # BLD AUTO: 8 K/MM3 (ref 4.8–10.8)

## 2021-03-02 PROCEDURE — 36415 COLL VENOUS BLD VENIPUNCTURE: CPT

## 2021-03-02 PROCEDURE — 77080 DXA BONE DENSITY AXIAL: CPT

## 2021-03-02 PROCEDURE — 80053 COMPREHEN METABOLIC PANEL: CPT

## 2021-03-02 PROCEDURE — 85025 COMPLETE CBC W/AUTO DIFF WBC: CPT

## 2021-03-02 PROCEDURE — 73630 X-RAY EXAM OF FOOT: CPT

## 2021-03-30 ENCOUNTER — HOSPITAL ENCOUNTER (OUTPATIENT)
Age: 51
End: 2021-03-30
Payer: COMMERCIAL

## 2021-03-30 DIAGNOSIS — R06.83: ICD-10-CM

## 2021-03-30 DIAGNOSIS — G47.33: Primary | ICD-10-CM

## 2021-03-30 PROCEDURE — G0399 HOME SLEEP TEST/TYPE 3 PORTA: HCPCS

## 2021-04-05 ENCOUNTER — HOSPITAL ENCOUNTER (OUTPATIENT)
Dept: HOSPITAL 22 - RAD | Age: 51
End: 2021-04-05
Payer: COMMERCIAL

## 2021-04-05 DIAGNOSIS — T14.8XXA: Primary | ICD-10-CM

## 2021-04-05 PROCEDURE — 73630 X-RAY EXAM OF FOOT: CPT

## 2021-04-19 ENCOUNTER — HOSPITAL ENCOUNTER (OUTPATIENT)
Age: 51
End: 2021-04-19
Payer: COMMERCIAL

## 2021-04-19 VITALS
OXYGEN SATURATION: 98 % | RESPIRATION RATE: 18 BRPM | SYSTOLIC BLOOD PRESSURE: 132 MMHG | DIASTOLIC BLOOD PRESSURE: 88 MMHG | HEART RATE: 74 BPM

## 2021-04-19 VITALS — BODY MASS INDEX: 23.3 KG/M2

## 2021-04-19 DIAGNOSIS — M46.1: Primary | ICD-10-CM

## 2021-04-19 PROCEDURE — G0463 HOSPITAL OUTPT CLINIC VISIT: HCPCS

## 2021-04-19 PROCEDURE — 99202 OFFICE O/P NEW SF 15 MIN: CPT

## 2021-04-30 ENCOUNTER — HOSPITAL ENCOUNTER (OUTPATIENT)
Dept: HOSPITAL 22 - SC.PAINP | Age: 51
Discharge: HOME | End: 2021-04-30
Payer: COMMERCIAL

## 2021-04-30 VITALS
DIASTOLIC BLOOD PRESSURE: 76 MMHG | RESPIRATION RATE: 18 BRPM | HEART RATE: 60 BPM | OXYGEN SATURATION: 98 % | SYSTOLIC BLOOD PRESSURE: 128 MMHG

## 2021-04-30 VITALS
SYSTOLIC BLOOD PRESSURE: 149 MMHG | HEART RATE: 69 BPM | DIASTOLIC BLOOD PRESSURE: 90 MMHG | RESPIRATION RATE: 18 BRPM | TEMPERATURE: 97.88 F | OXYGEN SATURATION: 98 %

## 2021-04-30 VITALS
DIASTOLIC BLOOD PRESSURE: 85 MMHG | OXYGEN SATURATION: 98 % | SYSTOLIC BLOOD PRESSURE: 125 MMHG | RESPIRATION RATE: 18 BRPM | HEART RATE: 85 BPM

## 2021-04-30 VITALS
DIASTOLIC BLOOD PRESSURE: 89 MMHG | SYSTOLIC BLOOD PRESSURE: 128 MMHG | HEART RATE: 85 BPM | OXYGEN SATURATION: 98 % | RESPIRATION RATE: 18 BRPM

## 2021-04-30 VITALS — BODY MASS INDEX: 32.3 KG/M2

## 2021-04-30 DIAGNOSIS — G43.909: ICD-10-CM

## 2021-04-30 DIAGNOSIS — M46.1: Primary | ICD-10-CM

## 2021-04-30 DIAGNOSIS — J44.9: ICD-10-CM

## 2021-04-30 DIAGNOSIS — F32.9: ICD-10-CM

## 2021-04-30 DIAGNOSIS — K21.9: ICD-10-CM

## 2021-04-30 PROCEDURE — G0260 INJ FOR SACROILIAC JT ANESTH: HCPCS

## 2021-04-30 PROCEDURE — 27096 INJECT SACROILIAC JOINT: CPT

## 2021-06-21 ENCOUNTER — HOSPITAL ENCOUNTER (OUTPATIENT)
Age: 51
End: 2021-06-21
Payer: COMMERCIAL

## 2021-06-21 VITALS
DIASTOLIC BLOOD PRESSURE: 82 MMHG | RESPIRATION RATE: 18 BRPM | OXYGEN SATURATION: 98 % | HEART RATE: 72 BPM | SYSTOLIC BLOOD PRESSURE: 123 MMHG

## 2021-06-21 VITALS — BODY MASS INDEX: 27.2 KG/M2

## 2021-06-21 DIAGNOSIS — M51.16: Primary | ICD-10-CM

## 2021-06-21 PROCEDURE — 99212 OFFICE O/P EST SF 10 MIN: CPT

## 2021-06-21 PROCEDURE — G0463 HOSPITAL OUTPT CLINIC VISIT: HCPCS

## 2021-07-02 ENCOUNTER — HOSPITAL ENCOUNTER (OUTPATIENT)
Dept: HOSPITAL 22 - SC.PAINP | Age: 51
Discharge: HOME | End: 2021-07-02
Payer: COMMERCIAL

## 2021-07-02 VITALS
HEART RATE: 77 BPM | SYSTOLIC BLOOD PRESSURE: 147 MMHG | TEMPERATURE: 98.06 F | RESPIRATION RATE: 18 BRPM | OXYGEN SATURATION: 97 % | DIASTOLIC BLOOD PRESSURE: 90 MMHG

## 2021-07-02 VITALS
SYSTOLIC BLOOD PRESSURE: 122 MMHG | HEART RATE: 66 BPM | RESPIRATION RATE: 20 BRPM | OXYGEN SATURATION: 97 % | DIASTOLIC BLOOD PRESSURE: 78 MMHG

## 2021-07-02 VITALS
RESPIRATION RATE: 18 BRPM | SYSTOLIC BLOOD PRESSURE: 157 MMHG | OXYGEN SATURATION: 96 % | DIASTOLIC BLOOD PRESSURE: 88 MMHG | HEART RATE: 68 BPM

## 2021-07-02 VITALS
RESPIRATION RATE: 18 BRPM | OXYGEN SATURATION: 96 % | DIASTOLIC BLOOD PRESSURE: 82 MMHG | SYSTOLIC BLOOD PRESSURE: 150 MMHG | HEART RATE: 67 BPM

## 2021-07-02 VITALS — BODY MASS INDEX: 31.9 KG/M2

## 2021-07-02 DIAGNOSIS — K21.9: ICD-10-CM

## 2021-07-02 DIAGNOSIS — F32.9: ICD-10-CM

## 2021-07-02 DIAGNOSIS — G43.909: ICD-10-CM

## 2021-07-02 DIAGNOSIS — M51.16: Primary | ICD-10-CM

## 2021-07-02 DIAGNOSIS — J44.9: ICD-10-CM

## 2021-07-02 DIAGNOSIS — M19.90: ICD-10-CM

## 2021-07-02 PROCEDURE — 62323 NJX INTERLAMINAR LMBR/SAC: CPT

## 2021-07-26 ENCOUNTER — HOSPITAL ENCOUNTER (OUTPATIENT)
Age: 51
End: 2021-07-26
Payer: COMMERCIAL

## 2021-07-26 VITALS
OXYGEN SATURATION: 96 % | SYSTOLIC BLOOD PRESSURE: 123 MMHG | RESPIRATION RATE: 18 BRPM | HEART RATE: 69 BPM | DIASTOLIC BLOOD PRESSURE: 78 MMHG

## 2021-07-26 VITALS — BODY MASS INDEX: 30 KG/M2

## 2021-07-26 DIAGNOSIS — M51.16: Primary | ICD-10-CM

## 2021-07-26 DIAGNOSIS — M79.18: ICD-10-CM

## 2021-07-26 PROCEDURE — 99212 OFFICE O/P EST SF 10 MIN: CPT

## 2021-07-26 PROCEDURE — G0463 HOSPITAL OUTPT CLINIC VISIT: HCPCS

## 2021-08-02 ENCOUNTER — HOSPITAL ENCOUNTER (OUTPATIENT)
Age: 51
End: 2021-08-02
Payer: COMMERCIAL

## 2021-08-02 DIAGNOSIS — M54.5: Primary | ICD-10-CM

## 2021-08-02 PROCEDURE — 76376 3D RENDER W/INTRP POSTPROCES: CPT

## 2021-08-02 PROCEDURE — 72148 MRI LUMBAR SPINE W/O DYE: CPT

## 2021-08-10 ENCOUNTER — HOSPITAL ENCOUNTER (OUTPATIENT)
Age: 51
End: 2021-08-10
Payer: COMMERCIAL

## 2021-08-10 DIAGNOSIS — M79.672: Primary | ICD-10-CM

## 2021-08-10 DIAGNOSIS — Z98.890: ICD-10-CM

## 2021-08-10 DIAGNOSIS — M79.671: ICD-10-CM

## 2021-08-10 PROCEDURE — 73630 X-RAY EXAM OF FOOT: CPT

## 2021-08-13 ENCOUNTER — HOSPITAL ENCOUNTER (OUTPATIENT)
Dept: HOSPITAL 22 - SC.PAINP | Age: 51
Discharge: HOME | End: 2021-08-13
Payer: COMMERCIAL

## 2021-08-13 VITALS
HEART RATE: 60 BPM | OXYGEN SATURATION: 96 % | SYSTOLIC BLOOD PRESSURE: 125 MMHG | DIASTOLIC BLOOD PRESSURE: 94 MMHG | RESPIRATION RATE: 18 BRPM

## 2021-08-13 VITALS
SYSTOLIC BLOOD PRESSURE: 152 MMHG | DIASTOLIC BLOOD PRESSURE: 93 MMHG | HEART RATE: 72 BPM | TEMPERATURE: 97.9 F | OXYGEN SATURATION: 98 % | RESPIRATION RATE: 18 BRPM

## 2021-08-13 VITALS
RESPIRATION RATE: 18 BRPM | HEART RATE: 65 BPM | SYSTOLIC BLOOD PRESSURE: 135 MMHG | OXYGEN SATURATION: 98 % | DIASTOLIC BLOOD PRESSURE: 75 MMHG

## 2021-08-13 VITALS — BODY MASS INDEX: 30 KG/M2

## 2021-08-13 VITALS
SYSTOLIC BLOOD PRESSURE: 125 MMHG | HEART RATE: 63 BPM | RESPIRATION RATE: 18 BRPM | OXYGEN SATURATION: 97 % | DIASTOLIC BLOOD PRESSURE: 94 MMHG

## 2021-08-13 DIAGNOSIS — K21.9: ICD-10-CM

## 2021-08-13 DIAGNOSIS — F32.9: ICD-10-CM

## 2021-08-13 DIAGNOSIS — G43.909: ICD-10-CM

## 2021-08-13 DIAGNOSIS — M79.18: Primary | ICD-10-CM

## 2021-08-13 DIAGNOSIS — J44.9: ICD-10-CM

## 2021-08-13 DIAGNOSIS — M19.90: ICD-10-CM

## 2021-08-13 PROCEDURE — 20552 NJX 1/MLT TRIGGER POINT 1/2: CPT

## 2021-08-27 ENCOUNTER — HOSPITAL ENCOUNTER (OUTPATIENT)
Age: 51
End: 2021-08-27
Payer: COMMERCIAL

## 2021-08-27 VITALS
HEART RATE: 51 BPM | DIASTOLIC BLOOD PRESSURE: 79 MMHG | SYSTOLIC BLOOD PRESSURE: 131 MMHG | RESPIRATION RATE: 18 BRPM | OXYGEN SATURATION: 96 %

## 2021-08-27 VITALS
HEART RATE: 55 BPM | OXYGEN SATURATION: 96 % | SYSTOLIC BLOOD PRESSURE: 123 MMHG | DIASTOLIC BLOOD PRESSURE: 74 MMHG | RESPIRATION RATE: 18 BRPM

## 2021-08-27 VITALS
TEMPERATURE: 98 F | DIASTOLIC BLOOD PRESSURE: 80 MMHG | HEART RATE: 59 BPM | OXYGEN SATURATION: 97 % | SYSTOLIC BLOOD PRESSURE: 136 MMHG | RESPIRATION RATE: 18 BRPM

## 2021-08-27 VITALS — BODY MASS INDEX: 29.6 KG/M2

## 2021-08-27 DIAGNOSIS — G47.419: ICD-10-CM

## 2021-08-27 DIAGNOSIS — K21.9: ICD-10-CM

## 2021-08-27 DIAGNOSIS — G43.909: ICD-10-CM

## 2021-08-27 DIAGNOSIS — F41.9: ICD-10-CM

## 2021-08-27 DIAGNOSIS — F32.9: ICD-10-CM

## 2021-08-27 DIAGNOSIS — M51.16: Primary | ICD-10-CM

## 2021-08-27 PROCEDURE — 62323 NJX INTERLAMINAR LMBR/SAC: CPT

## 2021-09-02 ENCOUNTER — HOSPITAL ENCOUNTER (OUTPATIENT)
Age: 51
End: 2021-09-02
Payer: COMMERCIAL

## 2021-09-02 DIAGNOSIS — Z12.31: Primary | ICD-10-CM

## 2021-09-02 PROCEDURE — 77063 BREAST TOMOSYNTHESIS BI: CPT

## 2021-09-02 PROCEDURE — 77067 SCR MAMMO BI INCL CAD: CPT

## 2021-09-22 ENCOUNTER — HOSPITAL ENCOUNTER (OUTPATIENT)
Age: 51
End: 2021-09-22
Payer: COMMERCIAL

## 2021-09-22 DIAGNOSIS — K64.9: ICD-10-CM

## 2021-09-22 DIAGNOSIS — Z01.812: Primary | ICD-10-CM

## 2021-09-22 DIAGNOSIS — Z11.52: ICD-10-CM

## 2021-09-22 LAB
25-OH VITAMIN D, TOTAL: 35.6 NG/ML (ref 30–100)
ALBUMIN LEVEL: 3.7 G/DL (ref 3.5–5)
ALBUMIN/GLOB SERPL: 1.4 {RATIO} (ref 1.1–1.8)
ALP ISO SERPL-ACNC: 115 U/L (ref 38–126)
ALT SERPLBLD-CCNC: 15 U/L (ref 12–78)
ANION GAP SERPL CALC-SCNC: 11.4 MEQ/L (ref 5–15)
AST SERPL QL: 20 U/L (ref 14–36)
BILIRUBIN,TOTAL: 0.2 MG/DL (ref 0.2–1.3)
BUN SERPL-MCNC: 18 MG/DL (ref 7–17)
CALCIUM SPEC-MCNC: 9.1 MG/DL (ref 8.4–10.2)
CHLORIDE SPEC-SCNC: 104 MMOL/L (ref 98–107)
CHOLEST SPEC-SCNC: 182 MG/DL (ref 140–200)
CO2 SERPL-SCNC: 30 MMOL/L (ref 22–30)
CREAT BLD-SCNC: 0.8 MG/DL (ref 0.52–1.04)
ESTIMATED GLOMERULAR FILT RATE: 76 ML/MIN (ref 60–?)
GFR (AFRICAN AMERICAN): 92 ML/MIN (ref 60–?)
GLOBULIN SER CALC-MCNC: 2.7 G/DL (ref 1.3–3.2)
GLUCOSE: 81 MG/DL (ref 74–100)
HBA1C MFR BLD: 6.1 % (ref 4–6)
HCT VFR BLD CALC: 40.6 % (ref 37–47)
HDLC SERPL-MCNC: 65 MG/DL (ref 40–60)
HGB BLD-MCNC: 13.1 G/DL (ref 12.2–16.2)
MCHC RBC-ENTMCNC: 32.2 G/DL (ref 31.8–35.4)
MCV RBC: 94.4 FL (ref 81–99)
MEAN CORPUSCULAR HEMOGLOBIN: 30.4 PG (ref 27–31.2)
PLATELET # BLD: 389 K/MM3 (ref 142–424)
POTASSIUM: 4.4 MMOL/L (ref 3.5–5.1)
PROT SERPL-MCNC: 6.4 G/DL (ref 6.3–8.2)
RBC # BLD AUTO: 4.3 M/MM3 (ref 4.2–5.4)
SODIUM SPEC-SCNC: 141 MMOL/L (ref 136–145)
TRIGLYCERIDES: 103 MG/DL (ref 30–150)
TSH SERPL-ACNC: 1.65 UIU/ML (ref 0.47–4.68)
VITAMIN B12: 626 PG/ML (ref 239–931)
WBC # BLD AUTO: 9.8 K/MM3 (ref 4.8–10.8)

## 2021-09-22 PROCEDURE — U0005 INFEC AGEN DETEC AMPLI PROBE: HCPCS

## 2021-09-22 PROCEDURE — 80053 COMPREHEN METABOLIC PANEL: CPT

## 2021-09-22 PROCEDURE — 82306 VITAMIN D 25 HYDROXY: CPT

## 2021-09-22 PROCEDURE — U0003 INFECTIOUS AGENT DETECTION BY NUCLEIC ACID (DNA OR RNA); SEVERE ACUTE RESPIRATORY SYNDROME CORONAVIRUS 2 (SARS-COV-2) (CORONAVIRUS DISEASE [COVID-19]), AMPLIFIED PROBE TECHNIQUE, MAKING USE OF HIGH THROUGHPUT TECHNOLOGIES AS DESCRIBED BY CMS-2020-01-R: HCPCS

## 2021-09-22 PROCEDURE — 83036 HEMOGLOBIN GLYCOSYLATED A1C: CPT

## 2021-09-22 PROCEDURE — 82607 VITAMIN B-12: CPT

## 2021-09-22 PROCEDURE — 80061 LIPID PANEL: CPT

## 2021-09-22 PROCEDURE — 85025 COMPLETE CBC W/AUTO DIFF WBC: CPT

## 2021-09-22 PROCEDURE — 84443 ASSAY THYROID STIM HORMONE: CPT

## 2021-09-22 PROCEDURE — 36415 COLL VENOUS BLD VENIPUNCTURE: CPT

## 2021-09-22 PROCEDURE — C9803 HOPD COVID-19 SPEC COLLECT: HCPCS

## 2021-09-24 ENCOUNTER — HOSPITAL ENCOUNTER (OUTPATIENT)
Dept: HOSPITAL 22 - OR | Age: 51
Discharge: HOME | End: 2021-09-24
Payer: COMMERCIAL

## 2021-09-24 VITALS
HEART RATE: 80 BPM | OXYGEN SATURATION: 95 % | DIASTOLIC BLOOD PRESSURE: 86 MMHG | TEMPERATURE: 97.9 F | SYSTOLIC BLOOD PRESSURE: 173 MMHG | RESPIRATION RATE: 16 BRPM

## 2021-09-24 VITALS
TEMPERATURE: 97.7 F | DIASTOLIC BLOOD PRESSURE: 82 MMHG | OXYGEN SATURATION: 91 % | HEART RATE: 80 BPM | SYSTOLIC BLOOD PRESSURE: 155 MMHG | RESPIRATION RATE: 16 BRPM

## 2021-09-24 VITALS
RESPIRATION RATE: 16 BRPM | HEART RATE: 66 BPM | OXYGEN SATURATION: 97 % | SYSTOLIC BLOOD PRESSURE: 144 MMHG | DIASTOLIC BLOOD PRESSURE: 77 MMHG | TEMPERATURE: 97.88 F

## 2021-09-24 VITALS
HEART RATE: 90 BPM | DIASTOLIC BLOOD PRESSURE: 88 MMHG | SYSTOLIC BLOOD PRESSURE: 139 MMHG | OXYGEN SATURATION: 96 % | TEMPERATURE: 97.9 F | RESPIRATION RATE: 16 BRPM

## 2021-09-24 VITALS
HEART RATE: 87 BPM | RESPIRATION RATE: 16 BRPM | SYSTOLIC BLOOD PRESSURE: 141 MMHG | DIASTOLIC BLOOD PRESSURE: 78 MMHG | OXYGEN SATURATION: 93 %

## 2021-09-24 VITALS — TEMPERATURE: 97.7 F | DIASTOLIC BLOOD PRESSURE: 82 MMHG | HEART RATE: 80 BPM | SYSTOLIC BLOOD PRESSURE: 155 MMHG

## 2021-09-24 VITALS
OXYGEN SATURATION: 96 % | SYSTOLIC BLOOD PRESSURE: 148 MMHG | TEMPERATURE: 97.9 F | RESPIRATION RATE: 16 BRPM | DIASTOLIC BLOOD PRESSURE: 88 MMHG | HEART RATE: 81 BPM

## 2021-09-24 VITALS
RESPIRATION RATE: 18 BRPM | OXYGEN SATURATION: 95 % | HEART RATE: 85 BPM | TEMPERATURE: 98.06 F | DIASTOLIC BLOOD PRESSURE: 92 MMHG | SYSTOLIC BLOOD PRESSURE: 153 MMHG

## 2021-09-24 VITALS — BODY MASS INDEX: 32.3 KG/M2

## 2021-09-24 VITALS
DIASTOLIC BLOOD PRESSURE: 80 MMHG | RESPIRATION RATE: 16 BRPM | SYSTOLIC BLOOD PRESSURE: 152 MMHG | OXYGEN SATURATION: 96 % | HEART RATE: 84 BPM

## 2021-09-24 VITALS
SYSTOLIC BLOOD PRESSURE: 143 MMHG | HEART RATE: 80 BPM | RESPIRATION RATE: 16 BRPM | TEMPERATURE: 97.88 F | OXYGEN SATURATION: 95 % | DIASTOLIC BLOOD PRESSURE: 89 MMHG

## 2021-09-24 VITALS — RESPIRATION RATE: 16 BRPM

## 2021-09-24 DIAGNOSIS — K62.3: ICD-10-CM

## 2021-09-24 DIAGNOSIS — M19.90: ICD-10-CM

## 2021-09-24 DIAGNOSIS — G43.909: ICD-10-CM

## 2021-09-24 DIAGNOSIS — J44.9: ICD-10-CM

## 2021-09-24 DIAGNOSIS — K64.8: Primary | ICD-10-CM

## 2021-09-24 DIAGNOSIS — K21.9: ICD-10-CM

## 2021-09-24 DIAGNOSIS — Z79.899: ICD-10-CM

## 2021-09-24 DIAGNOSIS — F32.9: ICD-10-CM

## 2021-09-24 PROCEDURE — 96374 THER/PROPH/DIAG INJ IV PUSH: CPT

## 2021-09-24 PROCEDURE — 46930 DESTROY INTERNAL HEMORRHOIDS: CPT

## 2021-09-30 ENCOUNTER — HOSPITAL ENCOUNTER (OUTPATIENT)
Age: 51
End: 2021-09-30
Payer: COMMERCIAL

## 2021-09-30 VITALS
HEART RATE: 82 BPM | OXYGEN SATURATION: 97 % | DIASTOLIC BLOOD PRESSURE: 85 MMHG | RESPIRATION RATE: 18 BRPM | SYSTOLIC BLOOD PRESSURE: 144 MMHG

## 2021-09-30 VITALS — BODY MASS INDEX: 30.8 KG/M2

## 2021-09-30 DIAGNOSIS — G57.00: ICD-10-CM

## 2021-09-30 DIAGNOSIS — M51.16: Primary | ICD-10-CM

## 2021-09-30 PROCEDURE — G0463 HOSPITAL OUTPT CLINIC VISIT: HCPCS

## 2021-09-30 PROCEDURE — 99212 OFFICE O/P EST SF 10 MIN: CPT

## 2021-10-15 ENCOUNTER — HOSPITAL ENCOUNTER (OUTPATIENT)
Dept: HOSPITAL 22 - SC.PAINP | Age: 51
Discharge: HOME | End: 2021-10-15
Payer: COMMERCIAL

## 2021-10-15 VITALS
HEART RATE: 77 BPM | TEMPERATURE: 98.24 F | DIASTOLIC BLOOD PRESSURE: 86 MMHG | SYSTOLIC BLOOD PRESSURE: 126 MMHG | RESPIRATION RATE: 18 BRPM | OXYGEN SATURATION: 98 %

## 2021-10-15 VITALS — BODY MASS INDEX: 31.4 KG/M2

## 2021-10-15 VITALS
OXYGEN SATURATION: 97 % | SYSTOLIC BLOOD PRESSURE: 121 MMHG | RESPIRATION RATE: 18 BRPM | DIASTOLIC BLOOD PRESSURE: 78 MMHG | HEART RATE: 60 BPM

## 2021-10-15 VITALS
DIASTOLIC BLOOD PRESSURE: 85 MMHG | HEART RATE: 69 BPM | OXYGEN SATURATION: 98 % | RESPIRATION RATE: 20 BRPM | SYSTOLIC BLOOD PRESSURE: 134 MMHG

## 2021-10-15 VITALS
DIASTOLIC BLOOD PRESSURE: 84 MMHG | HEART RATE: 71 BPM | RESPIRATION RATE: 18 BRPM | SYSTOLIC BLOOD PRESSURE: 127 MMHG | OXYGEN SATURATION: 97 %

## 2021-10-15 DIAGNOSIS — K21.9: ICD-10-CM

## 2021-10-15 DIAGNOSIS — M19.90: ICD-10-CM

## 2021-10-15 DIAGNOSIS — M51.16: Primary | ICD-10-CM

## 2021-10-15 DIAGNOSIS — G43.909: ICD-10-CM

## 2021-10-15 DIAGNOSIS — J44.9: ICD-10-CM

## 2021-10-15 DIAGNOSIS — F32.9: ICD-10-CM

## 2021-10-15 DIAGNOSIS — F41.9: ICD-10-CM

## 2021-10-15 PROCEDURE — 62323 NJX INTERLAMINAR LMBR/SAC: CPT

## 2021-10-28 ENCOUNTER — HOSPITAL ENCOUNTER (OUTPATIENT)
Age: 51
End: 2021-10-28
Payer: COMMERCIAL

## 2021-10-28 VITALS
OXYGEN SATURATION: 96 % | RESPIRATION RATE: 18 BRPM | DIASTOLIC BLOOD PRESSURE: 95 MMHG | HEART RATE: 70 BPM | SYSTOLIC BLOOD PRESSURE: 163 MMHG

## 2021-10-28 VITALS — BODY MASS INDEX: 31 KG/M2

## 2021-10-28 DIAGNOSIS — M51.16: Primary | ICD-10-CM

## 2021-10-28 PROCEDURE — 99212 OFFICE O/P EST SF 10 MIN: CPT

## 2021-10-28 PROCEDURE — G0463 HOSPITAL OUTPT CLINIC VISIT: HCPCS

## 2022-04-04 ENCOUNTER — HOSPITAL ENCOUNTER (OUTPATIENT)
Dept: HOSPITAL 22 - RAD | Age: 52
End: 2022-04-04
Payer: COMMERCIAL

## 2022-04-04 DIAGNOSIS — M79.672: Primary | ICD-10-CM

## 2022-04-04 PROCEDURE — 73630 X-RAY EXAM OF FOOT: CPT

## 2022-04-18 ENCOUNTER — HOSPITAL ENCOUNTER (OUTPATIENT)
Dept: HOSPITAL 22 - RAD | Age: 52
End: 2022-04-18
Payer: COMMERCIAL

## 2022-04-18 DIAGNOSIS — S99.192A: Primary | ICD-10-CM

## 2022-04-18 LAB
ALBUMIN LEVEL: 4 G/DL (ref 3.5–5)
ALBUMIN/GLOB SERPL: 1.6 {RATIO} (ref 1.1–1.8)
ALP ISO SERPL-ACNC: 134 U/L (ref 38–126)
ALT SERPLBLD-CCNC: 34 U/L (ref 12–78)
ANION GAP SERPL CALC-SCNC: 8.7 MEQ/L (ref 5–15)
AST SERPL QL: 26 U/L (ref 14–36)
BILIRUBIN,TOTAL: 0.3 MG/DL (ref 0.2–1.3)
BUN SERPL-MCNC: 13 MG/DL (ref 7–17)
CALCIUM SPEC-MCNC: 9 MG/DL (ref 8.4–10.2)
CHLORIDE SPEC-SCNC: 105 MMOL/L (ref 98–107)
CO2 SERPL-SCNC: 30 MMOL/L (ref 22–30)
CREAT BLD-SCNC: 0.7 MG/DL (ref 0.52–1.04)
CRP SERPL HS-MCNC: 15.2 MG/L (ref 0–4)
ESTIMATED GLOMERULAR FILT RATE: 89 ML/MIN (ref 60–?)
GFR (AFRICAN AMERICAN): 107 ML/MIN (ref 60–?)
GLOBULIN SER CALC-MCNC: 2.5 G/DL (ref 1.3–3.2)
GLUCOSE: 114 MG/DL (ref 74–100)
HCT VFR BLD CALC: 39.5 % (ref 37–47)
HGB BLD-MCNC: 12.8 G/DL (ref 12.2–16.2)
MCHC RBC-ENTMCNC: 32.6 G/DL (ref 31.8–35.4)
MCV RBC: 88.1 FL (ref 81–99)
MEAN CORPUSCULAR HEMOGLOBIN: 28.7 PG (ref 27–31.2)
PLATELET # BLD: 406 K/MM3 (ref 142–424)
POTASSIUM: 3.7 MMOL/L (ref 3.5–5.1)
PROT SERPL-MCNC: 6.5 G/DL (ref 6.3–8.2)
RBC # BLD AUTO: 4.48 M/MM3 (ref 4.2–5.4)
SODIUM SPEC-SCNC: 140 MMOL/L (ref 136–145)
WBC # BLD AUTO: 7.8 K/MM3 (ref 4.8–10.8)

## 2022-04-18 PROCEDURE — 82652 VIT D 1 25-DIHYDROXY: CPT

## 2022-04-18 PROCEDURE — 85025 COMPLETE CBC W/AUTO DIFF WBC: CPT

## 2022-04-18 PROCEDURE — 80053 COMPREHEN METABOLIC PANEL: CPT

## 2022-04-18 PROCEDURE — 73700 CT LOWER EXTREMITY W/O DYE: CPT

## 2022-04-18 PROCEDURE — 85651 RBC SED RATE NONAUTOMATED: CPT

## 2022-04-18 PROCEDURE — 86140 C-REACTIVE PROTEIN: CPT

## 2022-04-18 PROCEDURE — 36415 COLL VENOUS BLD VENIPUNCTURE: CPT

## 2022-04-24 LAB
1,25-DIHYDROXY, VITAMIN D-2: <10 PG/ML
1,25-DIHYDROXY, VITAMIN D-3: 33 PG/ML
VIT D25+D1,25 OH+D1,25 PNL SERPL-MCNC: 33 PG/ML

## 2022-04-27 ENCOUNTER — OFFICE VISIT (OUTPATIENT)
Dept: ORTHOPEDIC SURGERY | Facility: CLINIC | Age: 52
End: 2022-04-27

## 2022-04-27 VITALS
BODY MASS INDEX: 36.38 KG/M2 | DIASTOLIC BLOOD PRESSURE: 85 MMHG | SYSTOLIC BLOOD PRESSURE: 131 MMHG | HEIGHT: 66 IN | WEIGHT: 226.4 LBS

## 2022-04-27 DIAGNOSIS — G89.29 CHRONIC PAIN OF RIGHT KNEE: ICD-10-CM

## 2022-04-27 DIAGNOSIS — M25.551 RIGHT HIP PAIN: Primary | ICD-10-CM

## 2022-04-27 DIAGNOSIS — M25.561 CHRONIC PAIN OF RIGHT KNEE: ICD-10-CM

## 2022-04-27 PROCEDURE — 99204 OFFICE O/P NEW MOD 45 MIN: CPT | Performed by: ORTHOPAEDIC SURGERY

## 2022-04-27 RX ORDER — METHYLPREDNISOLONE 4 MG/1
TABLET ORAL
COMMUNITY
Start: 2022-03-25 | End: 2022-07-11

## 2022-04-27 RX ORDER — CYANOCOBALAMIN 1000 UG/ML
INJECTION, SOLUTION INTRAMUSCULAR; SUBCUTANEOUS
COMMUNITY
Start: 2022-04-05

## 2022-04-27 RX ORDER — SYRINGE WITH NEEDLE, 1 ML 25GX5/8"
SYRINGE, EMPTY DISPOSABLE MISCELLANEOUS
COMMUNITY
Start: 2022-04-05

## 2022-04-27 RX ORDER — VENLAFAXINE HYDROCHLORIDE 150 MG/1
CAPSULE, EXTENDED RELEASE ORAL
COMMUNITY
Start: 2022-04-05

## 2022-04-27 RX ORDER — CETIRIZINE HYDROCHLORIDE 10 MG/1
TABLET ORAL
COMMUNITY
Start: 2022-04-05

## 2022-04-27 RX ORDER — BUPROPION HYDROCHLORIDE 300 MG/1
TABLET ORAL
COMMUNITY
Start: 2022-04-05

## 2022-04-27 RX ORDER — DULAGLUTIDE 0.75 MG/.5ML
INJECTION, SOLUTION SUBCUTANEOUS
COMMUNITY
Start: 2022-04-19

## 2022-04-27 RX ORDER — PANTOPRAZOLE SODIUM 40 MG/1
TABLET, DELAYED RELEASE ORAL
COMMUNITY
Start: 2022-04-05

## 2022-04-27 NOTE — PROGRESS NOTES
[facility] [place] [facility] Note    Subjective     Chief Complaint   Patient presents with   • Right Hip - Pain   • Right Knee - Pain        HPI    Luanne Forrester is a 52 y.o. female who presents with new problem of: right knee pain.  Onset: atraumatic and gradual in nature. The issue has been ongoing for 5 year(s). Pain is a 10/10 on the pain scale. Pain is described as dull, aching and stabbing. Associated symptoms include pain and stiffness. The pain is worse with walking, standing, climbing stairs, sleeping, working, leisure and lying on affected side; resting improve the pain. Previous treatments have included: NSAIDS, physical therapy and oral steroids.     Luanne Forrester is a 52 y.o. female who presents with new problem of: right hip pain.  Onset: atraumatic and gradual in nature. The issue has been ongoing for 5 year(s). Pain is a 10/10 on the pain scale. Pain is described as dull, aching and stabbing. Associated symptoms include pain and stiffness. The pain is worse with walking, standing, climbing stairs, sleeping, working, leisure and lying on affected side; resting improve the pain. Previous treatments have included: NSAIDS, physical therapy and oral steroids.    History of back surgery with Dr. Reeder in December 2021, with a fusion from L3 to the pelvis.    I have reviewed the following portions of the patient's history and agree with: History of Present Illness and Review of Systems    There is no problem list on file for this patient.    Past Medical History:   Diagnosis Date   • Arthritis    • Bronchitis    • Migraine    • Pneumonia       Past Surgical History:   Procedure Laterality Date   • FOOT SURGERY Left 01/2019    Dr. Poole   • HYSTERECTOMY  2015    partial   • SPINE SURGERY  12/03/2021    Dr. Reeder   • TONSILLECTOMY  2002      Family History   Problem Relation Age of Onset   • Gout Mother    • Heart disease Mother    • Heart disease Father    • Diabetes Father    • Cancer Father    •  "Gout Father    • Hypertension Father      Social History     Socioeconomic History   • Marital status:    Tobacco Use   • Smoking status: Former Smoker   • Smokeless tobacco: Never Used   Vaping Use   • Vaping Use: Never used   Substance and Sexual Activity   • Alcohol use: Not Currently   • Drug use: Not Currently   • Sexual activity: Defer      Current Outpatient Medications on File Prior to Visit   Medication Sig Dispense Refill   • B-D 3CC LUER-STU SYR 25GX1\" 25G X 1\" 3 ML misc      • buPROPion XL (WELLBUTRIN XL) 300 MG 24 hr tablet      • cetirizine (zyrTEC) 10 MG tablet      • cyanocobalamin 1000 MCG/ML injection      • methylPREDNISolone (MEDROL) 4 MG dose pack      • pantoprazole (PROTONIX) 40 MG EC tablet      • Trulicity 0.75 MG/0.5ML solution pen-injector      • venlafaxine XR (EFFEXOR-XR) 150 MG 24 hr capsule        No current facility-administered medications on file prior to visit.      No Known Allergies     Review of Systems   Constitutional: Negative for activity change, appetite change, chills, diaphoresis, fatigue, fever and unexpected weight change.   HENT: Negative for congestion, dental problem, drooling, ear discharge, ear pain, facial swelling, hearing loss, mouth sores, nosebleeds, postnasal drip, rhinorrhea, sinus pressure, sneezing, sore throat, tinnitus, trouble swallowing and voice change.    Eyes: Negative for photophobia, pain, discharge, redness, itching and visual disturbance.   Respiratory: Negative for apnea, cough, choking, chest tightness, shortness of breath, wheezing and stridor.    Cardiovascular: Negative for chest pain, palpitations and leg swelling.   Gastrointestinal: Negative for abdominal distention, abdominal pain, anal bleeding, blood in stool, constipation, diarrhea, nausea, rectal pain and vomiting.   Endocrine: Negative for cold intolerance, heat intolerance, polydipsia, polyphagia and polyuria.   Genitourinary: Negative for decreased urine volume, " "difficulty urinating, dysuria, enuresis, flank pain, frequency, genital sores, hematuria and urgency.   Musculoskeletal: Positive for arthralgias, neck pain and neck stiffness. Negative for back pain, gait problem, joint swelling and myalgias.   Skin: Negative for color change, pallor, rash and wound.   Allergic/Immunologic: Negative for environmental allergies, food allergies and immunocompromised state.   Neurological: Positive for tremors and headaches. Negative for dizziness, seizures, syncope, facial asymmetry, speech difficulty, weakness, light-headedness and numbness.   Hematological: Negative for adenopathy. Does not bruise/bleed easily.   Psychiatric/Behavioral: Positive for sleep disturbance. Negative for agitation, behavioral problems, confusion, decreased concentration, dysphoric mood, hallucinations, self-injury and suicidal ideas. The patient is not nervous/anxious and is not hyperactive.         Objective      Physical Exam  /85   Ht 167.6 cm (65.98\")   Wt 103 kg (226 lb 6.4 oz)   BMI 36.56 kg/m²     Body mass index is 36.56 kg/m².    General:   Mental Status:  Alert   Appearance: Cooperative, in no acute distress   Build and Nutrition: Obese by BMI female   Orientation: Alert and oriented to person, place and time   Posture: Normal   Gait: Stiff legged on the right    Integument:   Right hip: No skin lesions, no rash, no ecchymosis   Right knee: No skin lesions, no rash, no ecchymosis    Neurologic:   Sensation:    Right foot: Intact to light touch on the dorsal and plantar aspect, although decreased on the dorsal aspect   Motor:  Right lower extremity: 5/5 quadriceps, hamstrings, ankle dorsiflexors, and ankle plantar flexors    Vascular:   Right lower extremity: 2+ dorsalis pedis pulse, prompt capillary refill    Lower Extremities:   Right Hip:    Tenderness:  Mild lateral tenderness    Swelling: None    Crepitus:  None    Atrophy:  None    Range of motion:  External " Rotation: 30°       Internal Rotation: 30°       Flexion:  100°       Extension:  0°   Instability:  None  Deformities:  None  Functional testing: Negative Stinchfield    No leg length discrepancy    Pain with internal and external rotation of the hip     Right Knee:    Tenderness:  Medial and lateral joint line tenderness    Effusion:  None    Swelling: None    Crepitus:  Soft    Range of motion:  Extension: 0°       Flexion: 125°  Instability:  No varus laxity, no valgus laxity, negative anterior drawer  Deformities:  None      Imaging/Studies      Imaging Results (Last 24 Hours)     Procedure Component Value Units Date/Time    XR Femur 2 View Right [[id number]] Resulted: 04/27/22 1025     Updated: 04/27/22 1026    Narrative:      Right Femur Radiographs  Indication: pain  Views: AP and lateral views of the right femur    Comparison: no prior studies available for review    Findings:  Patchy sclerotic bone lesion in the shaft of the femur, with no cortical   breakthrough, and no other unusual features.    XR Knee 4+ View Right [[id number]] Resulted: 04/27/22 1012     Updated: 04/27/22 1013    Narrative:      Right Knee Radiographs  Indication: right knee pain  Views: Standing AP's and skiers of both knees, with lateral and sunrise   views of the right knee    Comparison: no prior studies available    Findings:   Medial joint space narrowing, no acute bony abnormalities.  No unusual   bony features.    XR Hip With or Without Pelvis 2 - 3 View Right [[id number]] Resulted: 04/27/22 1010     Updated: 04/27/22 1012    Narrative:      Right Hip Radiographs  Indication: right hip pain  Views: low AP pelvis and lateral of the right hip    Comparison: no prior studies available for review    Findings:   No acute bony abnormalities.  Mild to moderate degenerative changes in the   hip joint, with sclerotic bone lesion seen in the femoral shaft region   with no cortical breakthrough, and evidence of screw fixation in the    sacroiliac region likely from a spinal fusion.          Assessment and Plan     Diagnoses and all orders for this visit:    1. Right hip pain (Primary)  -     XR Hip With or Without Pelvis 2 - 3 View Right  -     XR Femur 2 View Right  -     MRI Hip Right Without Contrast; Future  -     MRI Femur Right With & Without Contrast; Future    2. Chronic pain of right knee  -     XR Knee 4+ View Right        1. Right hip pain    2. Chronic pain of right knee        I reviewed my findings with the patient.  She is recovering from back surgery with Dr. Reeder in December 2021 with a fusion from L3 to the pelvis.  Most of her pain is in the posterior hip and radiates to the anterior knee.  I suspect that some of this is coming from her back, but at this point I would like to clarify her hip with further imaging with MRI to see if there is some contribution from an anatomic standpoint.  Also, she has an incidental bone lesion in the mid aspect of the femur, likely benign.  We will go ahead and evaluate with an MRI of her femur also.  I will see her back after these studies have been completed for review.  I will see her back sooner for any problems.    Return for After Imaging Study.      Pj Marcos MD  04/27/22  17:32 EDT

## 2022-06-19 ENCOUNTER — HOSPITAL ENCOUNTER (EMERGENCY)
Age: 52
Discharge: HOME | End: 2022-06-19
Payer: COMMERCIAL

## 2022-06-19 VITALS
OXYGEN SATURATION: 95 % | RESPIRATION RATE: 17 BRPM | DIASTOLIC BLOOD PRESSURE: 92 MMHG | TEMPERATURE: 97.8 F | SYSTOLIC BLOOD PRESSURE: 156 MMHG | HEART RATE: 110 BPM

## 2022-06-19 VITALS
HEART RATE: 110 BPM | TEMPERATURE: 97.8 F | SYSTOLIC BLOOD PRESSURE: 156 MMHG | RESPIRATION RATE: 17 BRPM | DIASTOLIC BLOOD PRESSURE: 92 MMHG

## 2022-06-19 VITALS — BODY MASS INDEX: 35.5 KG/M2

## 2022-06-19 DIAGNOSIS — J40: Primary | ICD-10-CM

## 2022-06-19 DIAGNOSIS — J01.90: ICD-10-CM

## 2022-06-19 PROCEDURE — 99212 OFFICE O/P EST SF 10 MIN: CPT

## 2022-06-19 PROCEDURE — G0463 HOSPITAL OUTPT CLINIC VISIT: HCPCS

## 2022-06-19 PROCEDURE — 96372 THER/PROPH/DIAG INJ SC/IM: CPT

## 2022-07-03 ENCOUNTER — HOSPITAL ENCOUNTER (OUTPATIENT)
Dept: MRI IMAGING | Facility: HOSPITAL | Age: 52
Discharge: HOME | End: 2022-07-03

## 2022-07-03 DIAGNOSIS — M25.551 RIGHT HIP PAIN: ICD-10-CM

## 2022-07-03 PROCEDURE — 73720 MRI LWR EXTREMITY W/O&W/DYE: CPT

## 2022-07-03 PROCEDURE — 73721 MRI JNT OF LWR EXTRE W/O DYE: CPT

## 2022-07-03 PROCEDURE — A9577 INJ MULTIHANCE: HCPCS | Performed by: ORTHOPAEDIC SURGERY

## 2022-07-03 PROCEDURE — 82565 ASSAY OF CREATININE: CPT

## 2022-07-03 PROCEDURE — 0 GADOBENATE DIMEGLUMINE 529 MG/ML SOLUTION: Performed by: ORTHOPAEDIC SURGERY

## 2022-07-03 RX ADMIN — GADOBENATE DIMEGLUMINE 20 ML: 529 INJECTION, SOLUTION INTRAVENOUS at 15:31

## 2022-07-11 ENCOUNTER — OFFICE VISIT (OUTPATIENT)
Dept: ORTHOPEDIC SURGERY | Facility: CLINIC | Age: 52
End: 2022-07-11

## 2022-07-11 VITALS
HEIGHT: 66 IN | SYSTOLIC BLOOD PRESSURE: 141 MMHG | WEIGHT: 227 LBS | DIASTOLIC BLOOD PRESSURE: 91 MMHG | BODY MASS INDEX: 36.48 KG/M2

## 2022-07-11 DIAGNOSIS — M25.551 RIGHT HIP PAIN: Primary | ICD-10-CM

## 2022-07-11 DIAGNOSIS — M89.9 BONE LESION: ICD-10-CM

## 2022-07-11 PROCEDURE — 99214 OFFICE O/P EST MOD 30 MIN: CPT | Performed by: ORTHOPAEDIC SURGERY

## 2022-07-11 RX ORDER — FLUTICASONE PROPIONATE 50 MCG
SPRAY, SUSPENSION (ML) NASAL
COMMUNITY
Start: 2022-06-17

## 2022-07-11 RX ORDER — BENZONATATE 100 MG/1
CAPSULE ORAL
COMMUNITY
Start: 2022-06-20

## 2022-07-11 RX ORDER — VITAMIN B COMPLEX
TABLET ORAL
COMMUNITY
Start: 2022-06-13

## 2022-07-11 RX ORDER — LISINOPRIL 10 MG/1
TABLET ORAL
COMMUNITY
Start: 2022-07-01

## 2022-07-11 NOTE — PROGRESS NOTES
[facility] [place] [facility]    Subjective     Chief Complaint   Patient presents with   • Follow-up     Right Hip Pain after MRI Right Hip/Femur 7/4/22        HPI    It has been 11  week(s) since Ms. Forrester's last visit. She returns to clinic today for follow-up of right hip pain. The issue has been ongoing for 5 year(s). She rates her pain a 8/10 on the pain scale. Previous/current treatments: nothing. Current symptoms: same as prior visit. The pain is worse with walking, standing, sitting, climbing stairs, sleeping, leisure, lying on affected side and any movement of the joint; resting improve the pain. Overall, she is doing worse.  She follows up today for the MRI of her right hip and right femur.  Pain is located primarily in the posterior aspect of the hip, but she does have some radiation into the thigh.    History of back surgery with Dr. Reeder in December 2021, with a fusion from L3 to the pelvis.    I have reviewed the following portions of the patient's history and agree with: History of Present Illness and Review of Systems    There is no problem list on file for this patient.    Past Medical History:   Diagnosis Date   • Arthritis    • Bronchitis    • Migraine    • Pneumonia       Past Surgical History:   Procedure Laterality Date   • FOOT SURGERY Left 01/2019    Dr. Poole   • HYSTERECTOMY  2015    partial   • SPINE SURGERY  12/03/2021    Dr. Reeder   • TONSILLECTOMY  2002      Family History   Problem Relation Age of Onset   • Gout Mother    • Heart disease Mother    • Heart disease Father    • Diabetes Father    • Cancer Father    • Gout Father    • Hypertension Father      Social History     Socioeconomic History   • Marital status:    Tobacco Use   • Smoking status: Former Smoker   • Smokeless tobacco: Never Used   Vaping Use   • Vaping Use: Never used   Substance and Sexual Activity   • Alcohol use: Not Currently   • Drug use: Not Currently   • Sexual activity: Defer      Current  "Outpatient Medications on File Prior to Visit   Medication Sig Dispense Refill   • B-D 3CC LUER-STU SYR 25GX1\" 25G X 1\" 3 ML misc      • benzonatate (TESSALON) 100 MG capsule      • buPROPion XL (WELLBUTRIN XL) 300 MG 24 hr tablet      • cetirizine (zyrTEC) 10 MG tablet      • cyanocobalamin 1000 MCG/ML injection      • fluticasone (FLONASE) 50 MCG/ACT nasal spray      • lisinopril (PRINIVIL,ZESTRIL) 10 MG tablet      • pantoprazole (PROTONIX) 40 MG EC tablet      • Trulicity 0.75 MG/0.5ML solution pen-injector      • venlafaxine XR (EFFEXOR-XR) 150 MG 24 hr capsule      • Vitamin D 25 MCG (1000 UT) tablet      • [DISCONTINUED] methylPREDNISolone (MEDROL) 4 MG dose pack        No current facility-administered medications on file prior to visit.      No Known Allergies     Review of Systems   Constitutional: Negative for activity change, appetite change, chills, diaphoresis, fatigue, fever and unexpected weight change.   HENT: Negative for congestion, dental problem, drooling, ear discharge, ear pain, facial swelling, hearing loss, mouth sores, nosebleeds, postnasal drip, rhinorrhea, sinus pressure, sneezing, sore throat, tinnitus, trouble swallowing and voice change.    Eyes: Negative for photophobia, pain, discharge, redness, itching and visual disturbance.   Respiratory: Negative for apnea, cough, choking, chest tightness, shortness of breath, wheezing and stridor.    Cardiovascular: Negative for chest pain, palpitations and leg swelling.   Gastrointestinal: Negative for abdominal distention, abdominal pain, anal bleeding, blood in stool, constipation, diarrhea, nausea, rectal pain and vomiting.   Endocrine: Negative for cold intolerance, heat intolerance, polydipsia, polyphagia and polyuria.   Genitourinary: Negative for decreased urine volume, difficulty urinating, dysuria, enuresis, flank pain, frequency, genital sores, hematuria and urgency.   Musculoskeletal: Positive for arthralgias. Negative for back pain, " "gait problem, joint swelling, myalgias, neck pain and neck stiffness.   Skin: Negative for color change, pallor, rash and wound.   Allergic/Immunologic: Negative for environmental allergies, food allergies and immunocompromised state.   Neurological: Negative for dizziness, tremors, seizures, syncope, facial asymmetry, speech difficulty, weakness, light-headedness, numbness and headaches.   Hematological: Negative for adenopathy. Does not bruise/bleed easily.   Psychiatric/Behavioral: Negative for agitation, behavioral problems, confusion, decreased concentration, dysphoric mood, hallucinations, self-injury, sleep disturbance and suicidal ideas. The patient is not nervous/anxious and is not hyperactive.         Objective      Physical Exam  /91   Ht 167.6 cm (65.98\")   Wt 103 kg (227 lb)   BMI 36.66 kg/m²     Body mass index is 36.66 kg/m².    General:   Mental Status:  Alert   Appearance: Cooperative, in no acute distress   Build and Nutrition: Obese by BMI female   Orientation: Alert and oriented to person, place and time   Posture: Normal   Gait: Limp on the right    Examination of right lower extremity: Negative Stinchfield.  Sensation and motor intact in the right lower extremity.  Unchanged from last examination.    Imaging/Studies  Imaging Results (Last 24 Hours)     ** No results found for the last 24 hours. **        MRI HIP RIGHT WO CONTRAST     7/3/2022 14:07 EDT     INDICATION: Right hip pain, posterior.     COMPARISON: Femur and hip radiograph 4/27/2022     TECHNIQUE: Multiplanar, multisequence images of the hip were performed according to routine hip MRI protocol.     FINDINGS:  OSSEOUS STRUCTURES  No fracture, stress reaction, avascular necrosis, or focal osseous lesion is seen. Bone marrow signal intensity is normal.     ARTICULAR CARTILAGE AND LABRUM  Articular cartilage: There is mild joint space narrowing and cartilage narrowing.   Labrum: There is a tear of thel anterosuperior labrum " with small paralabral cyst. The paralabral cyst measures maximally 4 mm.. The ligamentum teres is normal.  The alpha angle is  within normal limits (<55-60%).     JOINT OR BURSAL EFFUSION  Joint effusion: There is no significant joint effusion.  Bursal effusion: No iliopsoas or trochanteric bursal effusion is present.     MUSCLES AND TENDONS  The rectus femoris, iliopsoas, proximal hamstrings, quadratus femoris, and hip abductors are intact.     OTHER FINDINGS  Miscellaneous: Osseous fusion changes lumbar spine and sacroiliac joints. Sacroiliac joints are well aligned. No joint effusion.     IMPRESSION:  1.There is mild arthritis of the hips. There is a tear of the superior labrum with small paralabral cyst anteriorly superiorly measuring about 4 mm.  2.Extensive fusion changes of the lumbar spine and sacroiliac joints.     Electronically Signed: Mirela Reed MD 7/4/2022 17:20 EDT    MRI FEMUR RIGHT W WO CONTRAST     Date of Exam: 7/3/2022 14:44 EDT     Indication: Bone lesion mid diaphyseal region, right femur.     Comparison Exams: Femur radiograph 4/27/2022, MRI of the right hip 7/3/2022.     Technique: Prior to and after uneventful administration of 20 mL MultiHance IV contrast, multiplanar multisequence images of the femurs performed according to routine MRI protocol.     FINDINGS:  There is a sclerotic lesion of the mid diaphysis of the femur with cortical thickening and mild endosteal scalloping along the lateral cortex of the cortical thickening best seen in the coronal image #17. There are small hyperintense T2 signal intensity   foci. There is no significant enhancement. There is no significant marrow edema. It measures approximately 2.4 cm craniocaudal. Involves the entirely of the medullary space and the cortical thickening is most pronounced along the anterior and medial and   lateral margins. There are no other intraosseous lesions identified. There are no fractures present. There is partial fatty  "atrophy of the rectus femoris muscle proximally. There are no soft tissue masses.     IMPRESSION:  1.There is a nonaggressive appearing sclerotic lesion in the mid diaphysis of the femur with mild cortical thickening and small hyperintense T2 signal intensity on MRI. No significant enhancement is seen. This is consistent with the lesion seen on   radiograph which is mixed lytic/sclerotic and geographic. This is most likely an benign lesion such as enchondroma or fibrous dysplasia. Metastatic lesion or myeloma are thought to be unlikely given this appearance. Recommend radiographic follow-up in 6   months time.  2.Please see MRI of the hip performed same date for findings of the right hip.     Electronically Signed: Mirela Reed MD 7/4/2022 17:30 EDT    I reviewed the above imaging, and agree with findings.      Assessment and Plan     Diagnoses and all orders for this visit:    1. Right hip pain (Primary)    2. Bone lesion  -     Ambulatory Referral to Orthopedic Surgery        1. Right hip pain    2. Bone lesion        I reviewed my findings with the patient today.  MRI shows mild degeneration in the right hip, and MRI of the femur shows a benign-appearing bone lesion in the mid diaphyseal region according to the radiology report.  I think it is likely this is a benign bone lesion, but given the location of the patient's pain I would like for her to see an orthopedic oncologist for a second opinion to ensure stability.  I will make a referral for her to see Dr. Villanueva.  I will defer any further imaging to his expertise.  I do think there is a significant component of her \"hip\" pain from her back.    Return for Referral to Dr. Villanueva..      Pj Marcos MD  07/11/22  17:21 EDT    "

## 2022-07-28 LAB — CREAT BLDA-MCNC: 0.7 MG/DL (ref 0.6–1.3)

## 2022-08-05 ENCOUNTER — HOSPITAL ENCOUNTER (OUTPATIENT)
Age: 52
End: 2022-08-05
Payer: COMMERCIAL

## 2022-08-05 DIAGNOSIS — M16.11: ICD-10-CM

## 2022-08-05 DIAGNOSIS — Z01.818: Primary | ICD-10-CM

## 2022-08-05 LAB
ALBUMIN LEVEL: 3.9 G/DL (ref 3.5–5)
ANION GAP SERPL CALC-SCNC: 8.2 MEQ/L (ref 5–15)
BUN SERPL-MCNC: 13 MG/DL (ref 7–17)
CALCIUM SPEC-MCNC: 9.4 MG/DL (ref 8.4–10.2)
CHLORIDE SPEC-SCNC: 105 MMOL/L (ref 98–107)
CO2 SERPL-SCNC: 30 MMOL/L (ref 22–30)
CREAT BLD-SCNC: 0.7 MG/DL (ref 0.52–1.04)
ESTIMATED GLOMERULAR FILT RATE: 89 ML/MIN (ref 60–?)
GFR (AFRICAN AMERICAN): 107 ML/MIN (ref 60–?)
GLUCOSE: 99 MG/DL (ref 74–100)
HCT VFR BLD CALC: 41.5 % (ref 37–47)
HGB BLD-MCNC: 12.8 G/DL (ref 12.2–16.2)
MCHC RBC-ENTMCNC: 30.8 G/DL (ref 31.8–35.4)
MCV RBC: 91.8 FL (ref 81–99)
MEAN CORPUSCULAR HEMOGLOBIN: 28.3 PG (ref 27–31.2)
PLATELET # BLD: 396 K/MM3 (ref 142–424)
POTASSIUM: 4.2 MMOL/L (ref 3.5–5.1)
RBC # BLD AUTO: 4.52 M/MM3 (ref 4.2–5.4)
SODIUM SPEC-SCNC: 139 MMOL/L (ref 136–145)
WBC # BLD AUTO: 9.3 K/MM3 (ref 4.8–10.8)

## 2022-08-05 PROCEDURE — 85048 AUTOMATED LEUKOCYTE COUNT: CPT

## 2022-08-05 PROCEDURE — 36415 COLL VENOUS BLD VENIPUNCTURE: CPT

## 2022-08-05 PROCEDURE — 85049 AUTOMATED PLATELET COUNT: CPT

## 2022-08-05 PROCEDURE — 82040 ASSAY OF SERUM ALBUMIN: CPT

## 2022-08-05 PROCEDURE — 85018 HEMOGLOBIN: CPT

## 2022-08-05 PROCEDURE — 80048 BASIC METABOLIC PNL TOTAL CA: CPT

## 2022-08-05 PROCEDURE — 85014 HEMATOCRIT: CPT

## 2022-08-12 ENCOUNTER — HOSPITAL ENCOUNTER (OUTPATIENT)
Age: 52
End: 2022-08-12
Payer: COMMERCIAL

## 2022-08-12 DIAGNOSIS — Z20.822: ICD-10-CM

## 2022-08-12 DIAGNOSIS — Z01.812: Primary | ICD-10-CM

## 2022-08-12 DIAGNOSIS — M19.071: ICD-10-CM

## 2022-08-12 PROCEDURE — U0005 INFEC AGEN DETEC AMPLI PROBE: HCPCS

## 2022-08-12 PROCEDURE — U0003 INFECTIOUS AGENT DETECTION BY NUCLEIC ACID (DNA OR RNA); SEVERE ACUTE RESPIRATORY SYNDROME CORONAVIRUS 2 (SARS-COV-2) (CORONAVIRUS DISEASE [COVID-19]), AMPLIFIED PROBE TECHNIQUE, MAKING USE OF HIGH THROUGHPUT TECHNOLOGIES AS DESCRIBED BY CMS-2020-01-R: HCPCS

## 2022-08-12 PROCEDURE — C9803 HOPD COVID-19 SPEC COLLECT: HCPCS

## 2022-09-07 ENCOUNTER — HOSPITAL ENCOUNTER (OUTPATIENT)
Dept: HOSPITAL 22 - RT | Age: 52
End: 2022-09-07
Payer: COMMERCIAL

## 2022-09-07 DIAGNOSIS — R22.41: Primary | ICD-10-CM

## 2022-09-07 PROCEDURE — 93971 EXTREMITY STUDY: CPT

## 2022-09-15 ENCOUNTER — HOSPITAL ENCOUNTER (OUTPATIENT)
Age: 52
End: 2022-09-15
Payer: COMMERCIAL

## 2022-09-15 DIAGNOSIS — R25.1: ICD-10-CM

## 2022-09-15 DIAGNOSIS — M54.2: Primary | ICD-10-CM

## 2022-09-15 PROCEDURE — 72050 X-RAY EXAM NECK SPINE 4/5VWS: CPT

## 2022-10-24 ENCOUNTER — HOSPITAL ENCOUNTER (OUTPATIENT)
Age: 52
End: 2022-10-24
Payer: COMMERCIAL

## 2022-10-24 DIAGNOSIS — M79.672: ICD-10-CM

## 2022-10-24 DIAGNOSIS — M79.671: Primary | ICD-10-CM

## 2022-10-24 PROCEDURE — 73630 X-RAY EXAM OF FOOT: CPT

## 2022-12-13 ENCOUNTER — HOSPITAL ENCOUNTER (OUTPATIENT)
Age: 52
End: 2022-12-13
Payer: COMMERCIAL

## 2022-12-13 DIAGNOSIS — I10: ICD-10-CM

## 2022-12-13 DIAGNOSIS — R07.9: Primary | ICD-10-CM

## 2022-12-13 PROCEDURE — A9502 TC99M TETROFOSMIN: HCPCS

## 2022-12-13 PROCEDURE — 78452 HT MUSCLE IMAGE SPECT MULT: CPT

## 2022-12-13 PROCEDURE — 93017 CV STRESS TEST TRACING ONLY: CPT

## 2022-12-22 ENCOUNTER — HOSPITAL ENCOUNTER (OUTPATIENT)
Age: 52
End: 2022-12-22
Payer: COMMERCIAL

## 2022-12-22 DIAGNOSIS — I20.8: Primary | ICD-10-CM

## 2022-12-22 DIAGNOSIS — R94.30: ICD-10-CM

## 2022-12-22 DIAGNOSIS — I10: ICD-10-CM

## 2022-12-22 LAB
ALBUMIN LEVEL: 4.1 G/DL (ref 3.5–5)
ALP ISO SERPL-ACNC: 169 U/L (ref 38–126)
ALT SERPLBLD-CCNC: 24 U/L (ref 12–78)
ANION GAP SERPL CALC-SCNC: 12.1 MEQ/L (ref 5–15)
AST SERPL QL: 23 U/L (ref 14–36)
BILIRUB DIRECT SERPL-MCNC: 0 MG/DL (ref 0–0.4)
BILIRUB INDIRECT SERPL-MCNC: 0 MG/DL (ref 0–1.1)
BILIRUB INDIRECT SERPL-MCNC: 0.1 MG/DL (ref 0–0.9)
BILIRUBIN,TOTAL: < 0.1 MG/DL (ref 0.2–1.3)
BUN SERPL-MCNC: 21 MG/DL (ref 7–17)
CALCIUM SPEC-MCNC: 9.8 MG/DL (ref 8.4–10.2)
CHLORIDE SPEC-SCNC: 107 MMOL/L (ref 98–107)
CHOLEST SPEC-SCNC: 176 MG/DL (ref 140–200)
CO2 SERPL-SCNC: 24 MMOL/L (ref 22–30)
CREAT BLD-SCNC: 0.7 MG/DL (ref 0.52–1.04)
ESTIMATED GLOMERULAR FILT RATE: 89 ML/MIN (ref 60–?)
GFR (AFRICAN AMERICAN): 107 ML/MIN (ref 60–?)
GLUCOSE: 103 MG/DL (ref 74–100)
HCT VFR BLD CALC: 39.8 % (ref 37–47)
HDLC SERPL-MCNC: 40 MG/DL (ref 40–60)
HGB BLD-MCNC: 12.6 G/DL (ref 12.2–16.2)
MAGNESIUM: 1.9 MG/DL (ref 1.6–2.3)
MCHC RBC-ENTMCNC: 31.6 G/DL (ref 31.8–35.4)
MCV RBC: 88.2 FL (ref 81–99)
MEAN CORPUSCULAR HEMOGLOBIN: 27.9 PG (ref 27–31.2)
PLATELET # BLD: 419 K/MM3 (ref 142–424)
POTASSIUM: 4.1 MMOL/L (ref 3.5–5.1)
PROT SERPL-MCNC: 6.7 G/DL (ref 6.3–8.2)
RBC # BLD AUTO: 4.52 M/MM3 (ref 4.2–5.4)
SODIUM SPEC-SCNC: 139 MMOL/L (ref 136–145)
T4 FREE SERPL-MCNC: 1.03 NG/DL (ref 0.78–2.19)
TRIGLYCERIDES: 98 MG/DL (ref 30–150)
TSH SERPL-ACNC: 1.87 UIU/ML (ref 0.47–4.68)
WBC # BLD AUTO: 8.5 K/MM3 (ref 4.8–10.8)

## 2022-12-22 PROCEDURE — 36415 COLL VENOUS BLD VENIPUNCTURE: CPT

## 2022-12-22 PROCEDURE — 83735 ASSAY OF MAGNESIUM: CPT

## 2022-12-22 PROCEDURE — 80076 HEPATIC FUNCTION PANEL: CPT

## 2022-12-22 PROCEDURE — 80048 BASIC METABOLIC PNL TOTAL CA: CPT

## 2022-12-22 PROCEDURE — 85025 COMPLETE CBC W/AUTO DIFF WBC: CPT

## 2022-12-22 PROCEDURE — 80061 LIPID PANEL: CPT

## 2022-12-22 PROCEDURE — 84439 ASSAY OF FREE THYROXINE: CPT

## 2022-12-22 PROCEDURE — 84443 ASSAY THYROID STIM HORMONE: CPT

## 2022-12-28 ENCOUNTER — HOSPITAL ENCOUNTER (OUTPATIENT)
Age: 52
Discharge: HOME | End: 2022-12-28
Payer: COMMERCIAL

## 2022-12-28 VITALS
DIASTOLIC BLOOD PRESSURE: 61 MMHG | SYSTOLIC BLOOD PRESSURE: 114 MMHG | OXYGEN SATURATION: 94 % | RESPIRATION RATE: 16 BRPM | HEART RATE: 61 BPM

## 2022-12-28 VITALS
DIASTOLIC BLOOD PRESSURE: 68 MMHG | RESPIRATION RATE: 16 BRPM | OXYGEN SATURATION: 94 % | SYSTOLIC BLOOD PRESSURE: 121 MMHG | HEART RATE: 64 BPM

## 2022-12-28 VITALS
RESPIRATION RATE: 18 BRPM | DIASTOLIC BLOOD PRESSURE: 69 MMHG | HEART RATE: 60 BPM | SYSTOLIC BLOOD PRESSURE: 130 MMHG | OXYGEN SATURATION: 95 %

## 2022-12-28 VITALS
RESPIRATION RATE: 16 BRPM | SYSTOLIC BLOOD PRESSURE: 121 MMHG | DIASTOLIC BLOOD PRESSURE: 64 MMHG | OXYGEN SATURATION: 93 % | HEART RATE: 60 BPM

## 2022-12-28 VITALS
DIASTOLIC BLOOD PRESSURE: 69 MMHG | RESPIRATION RATE: 18 BRPM | HEART RATE: 64 BPM | SYSTOLIC BLOOD PRESSURE: 129 MMHG | OXYGEN SATURATION: 95 %

## 2022-12-28 VITALS
SYSTOLIC BLOOD PRESSURE: 131 MMHG | HEART RATE: 67 BPM | DIASTOLIC BLOOD PRESSURE: 62 MMHG | OXYGEN SATURATION: 96 % | RESPIRATION RATE: 18 BRPM

## 2022-12-28 VITALS
HEART RATE: 64 BPM | OXYGEN SATURATION: 100 % | SYSTOLIC BLOOD PRESSURE: 135 MMHG | RESPIRATION RATE: 18 BRPM | DIASTOLIC BLOOD PRESSURE: 75 MMHG

## 2022-12-28 VITALS — BODY MASS INDEX: 37.9 KG/M2

## 2022-12-28 VITALS
RESPIRATION RATE: 18 BRPM | HEART RATE: 77 BPM | SYSTOLIC BLOOD PRESSURE: 121 MMHG | OXYGEN SATURATION: 96 % | DIASTOLIC BLOOD PRESSURE: 61 MMHG

## 2022-12-28 VITALS
OXYGEN SATURATION: 95 % | SYSTOLIC BLOOD PRESSURE: 129 MMHG | DIASTOLIC BLOOD PRESSURE: 55 MMHG | RESPIRATION RATE: 18 BRPM | HEART RATE: 55 BPM

## 2022-12-28 VITALS
SYSTOLIC BLOOD PRESSURE: 114 MMHG | DIASTOLIC BLOOD PRESSURE: 63 MMHG | HEART RATE: 61 BPM | OXYGEN SATURATION: 95 % | RESPIRATION RATE: 18 BRPM

## 2022-12-28 VITALS
RESPIRATION RATE: 18 BRPM | OXYGEN SATURATION: 95 % | DIASTOLIC BLOOD PRESSURE: 80 MMHG | SYSTOLIC BLOOD PRESSURE: 153 MMHG | HEART RATE: 62 BPM

## 2022-12-28 VITALS — HEART RATE: 64 BPM

## 2022-12-28 VITALS
HEART RATE: 73 BPM | DIASTOLIC BLOOD PRESSURE: 63 MMHG | SYSTOLIC BLOOD PRESSURE: 127 MMHG | OXYGEN SATURATION: 99 % | RESPIRATION RATE: 18 BRPM

## 2022-12-28 VITALS — RESPIRATION RATE: 18 BRPM

## 2022-12-28 DIAGNOSIS — Z79.4: ICD-10-CM

## 2022-12-28 DIAGNOSIS — Z79.899: ICD-10-CM

## 2022-12-28 DIAGNOSIS — Z82.49: ICD-10-CM

## 2022-12-28 DIAGNOSIS — R94.31: ICD-10-CM

## 2022-12-28 DIAGNOSIS — I25.118: Primary | ICD-10-CM

## 2022-12-28 DIAGNOSIS — E11.9: ICD-10-CM

## 2022-12-28 DIAGNOSIS — I10: ICD-10-CM

## 2022-12-28 DIAGNOSIS — R94.30: ICD-10-CM

## 2022-12-28 PROCEDURE — C1725 CATH, TRANSLUMIN NON-LASER: HCPCS

## 2022-12-28 PROCEDURE — 93458 L HRT ARTERY/VENTRICLE ANGIO: CPT

## 2022-12-28 PROCEDURE — 99152 MOD SED SAME PHYS/QHP 5/>YRS: CPT

## 2022-12-28 PROCEDURE — 93306 TTE W/DOPPLER COMPLETE: CPT

## 2022-12-28 PROCEDURE — C1769 GUIDE WIRE: HCPCS

## 2023-01-19 ENCOUNTER — HOSPITAL ENCOUNTER (OUTPATIENT)
Dept: HOSPITAL 22 - RAD | Age: 53
End: 2023-01-19
Payer: COMMERCIAL

## 2023-01-19 DIAGNOSIS — M79.671: Primary | ICD-10-CM

## 2023-01-19 DIAGNOSIS — M79.672: ICD-10-CM

## 2023-01-19 PROCEDURE — 73630 X-RAY EXAM OF FOOT: CPT

## 2023-02-09 ENCOUNTER — HOSPITAL ENCOUNTER (OUTPATIENT)
Age: 53
End: 2023-02-09
Payer: COMMERCIAL

## 2023-02-09 DIAGNOSIS — Z12.31: Primary | ICD-10-CM

## 2023-02-09 PROCEDURE — 77063 BREAST TOMOSYNTHESIS BI: CPT

## 2023-02-09 PROCEDURE — 77067 SCR MAMMO BI INCL CAD: CPT

## 2023-02-22 ENCOUNTER — HOSPITAL ENCOUNTER (OUTPATIENT)
Age: 53
Discharge: HOME | End: 2023-02-22
Payer: COMMERCIAL

## 2023-02-22 VITALS
HEART RATE: 81 BPM | RESPIRATION RATE: 18 BRPM | TEMPERATURE: 97.34 F | SYSTOLIC BLOOD PRESSURE: 135 MMHG | DIASTOLIC BLOOD PRESSURE: 83 MMHG | OXYGEN SATURATION: 94 %

## 2023-02-22 VITALS
DIASTOLIC BLOOD PRESSURE: 70 MMHG | OXYGEN SATURATION: 96 % | RESPIRATION RATE: 20 BRPM | SYSTOLIC BLOOD PRESSURE: 155 MMHG | HEART RATE: 70 BPM

## 2023-02-22 VITALS — OXYGEN SATURATION: 94 %

## 2023-02-22 VITALS
HEART RATE: 87 BPM | OXYGEN SATURATION: 94 % | TEMPERATURE: 97 F | RESPIRATION RATE: 17 BRPM | DIASTOLIC BLOOD PRESSURE: 73 MMHG | SYSTOLIC BLOOD PRESSURE: 128 MMHG

## 2023-02-22 VITALS
HEART RATE: 89 BPM | SYSTOLIC BLOOD PRESSURE: 145 MMHG | DIASTOLIC BLOOD PRESSURE: 87 MMHG | RESPIRATION RATE: 16 BRPM | OXYGEN SATURATION: 98 %

## 2023-02-22 VITALS — BODY MASS INDEX: 82.5 KG/M2

## 2023-02-22 DIAGNOSIS — R13.10: ICD-10-CM

## 2023-02-22 DIAGNOSIS — E11.9: ICD-10-CM

## 2023-02-22 DIAGNOSIS — K21.9: Primary | ICD-10-CM

## 2023-02-22 LAB — GLUCOSE BLDC GLUCOMTR-MCNC: 89 MG/DL (ref 70–110)

## 2023-02-22 PROCEDURE — 43239 EGD BIOPSY SINGLE/MULTIPLE: CPT

## 2023-02-22 PROCEDURE — 43248 EGD GUIDE WIRE INSERTION: CPT

## 2023-02-22 PROCEDURE — 0DJ08ZZ INSPECTION OF UPPER INTESTINAL TRACT, VIA NATURAL OR ARTIFICIAL OPENING ENDOSCOPIC: ICD-10-PCS | Performed by: INTERNAL MEDICINE

## 2023-02-22 PROCEDURE — 82962 GLUCOSE BLOOD TEST: CPT

## 2023-02-27 ENCOUNTER — HOSPITAL ENCOUNTER (OUTPATIENT)
Age: 53
End: 2023-02-27
Payer: COMMERCIAL

## 2023-02-27 DIAGNOSIS — R60.0: ICD-10-CM

## 2023-02-27 DIAGNOSIS — I10: ICD-10-CM

## 2023-02-27 DIAGNOSIS — R94.31: ICD-10-CM

## 2023-02-27 DIAGNOSIS — R00.2: Primary | ICD-10-CM

## 2023-02-27 DIAGNOSIS — I20.9: ICD-10-CM

## 2023-02-27 DIAGNOSIS — Z82.49: ICD-10-CM

## 2023-02-27 DIAGNOSIS — R94.30: ICD-10-CM

## 2023-02-27 LAB
ANION GAP SERPL CALC-SCNC: 5.4 MEQ/L (ref 5–15)
BUN SERPL-MCNC: 14 MG/DL (ref 7–17)
CALCIUM SPEC-MCNC: 9.6 MG/DL (ref 8.4–10.2)
CHLORIDE SPEC-SCNC: 101 MMOL/L (ref 98–107)
CO2 SERPL-SCNC: 34 MMOL/L (ref 22–30)
CREAT BLD-SCNC: 1 MG/DL (ref 0.52–1.04)
ESTIMATED GLOMERULAR FILT RATE: 58 ML/MIN (ref 60–?)
GFR (AFRICAN AMERICAN): 71 ML/MIN (ref 60–?)
GLUCOSE: 93 MG/DL (ref 74–100)
POTASSIUM: 4.4 MMOL/L (ref 3.5–5.1)
SODIUM SPEC-SCNC: 136 MMOL/L (ref 136–145)

## 2023-02-27 PROCEDURE — 36415 COLL VENOUS BLD VENIPUNCTURE: CPT

## 2023-02-27 PROCEDURE — 80048 BASIC METABOLIC PNL TOTAL CA: CPT

## 2023-02-28 ENCOUNTER — HOSPITAL ENCOUNTER (OUTPATIENT)
Dept: HOSPITAL 22 - RAD | Age: 53
End: 2023-02-28
Payer: COMMERCIAL

## 2023-02-28 DIAGNOSIS — G57.82: Primary | ICD-10-CM

## 2023-02-28 DIAGNOSIS — M25.372: ICD-10-CM

## 2023-02-28 DIAGNOSIS — M79.672: ICD-10-CM

## 2023-02-28 DIAGNOSIS — M25.371: ICD-10-CM

## 2023-02-28 DIAGNOSIS — S82.61XA: ICD-10-CM

## 2023-02-28 PROCEDURE — 73721 MRI JNT OF LWR EXTRE W/O DYE: CPT

## 2023-02-28 PROCEDURE — 73718 MRI LOWER EXTREMITY W/O DYE: CPT

## 2023-03-03 ENCOUNTER — HOSPITAL ENCOUNTER (OUTPATIENT)
Age: 53
End: 2023-03-03
Payer: COMMERCIAL

## 2023-03-03 DIAGNOSIS — M25.552: Primary | ICD-10-CM

## 2023-03-03 LAB
ALBUMIN LEVEL: 3.9 G/DL (ref 3.5–5)
ANION GAP SERPL CALC-SCNC: 9.1 MEQ/L (ref 5–15)
BUN SERPL-MCNC: 19 MG/DL (ref 7–17)
CALCIUM SPEC-MCNC: 9 MG/DL (ref 8.4–10.2)
CHLORIDE SPEC-SCNC: 102 MMOL/L (ref 98–107)
CO2 SERPL-SCNC: 29 MMOL/L (ref 22–30)
CREAT BLD-SCNC: 0.8 MG/DL (ref 0.52–1.04)
ESTIMATED GLOMERULAR FILT RATE: 76 ML/MIN (ref 60–?)
GFR (AFRICAN AMERICAN): 92 ML/MIN (ref 60–?)
GLUCOSE: 131 MG/DL (ref 74–100)
HBA1C MFR BLD: 5.9 % (ref 4–6)
HCT VFR BLD CALC: 41 % (ref 37–47)
HGB BLD-MCNC: 13.1 G/DL (ref 12.2–16.2)
MCHC RBC-ENTMCNC: 32 G/DL (ref 31.8–35.4)
MCV RBC: 89.3 FL (ref 81–99)
MEAN CORPUSCULAR HEMOGLOBIN: 28.5 PG (ref 27–31.2)
PLATELET # BLD: 442 K/MM3 (ref 142–424)
POTASSIUM: 4.1 MMOL/L (ref 3.5–5.1)
RBC # BLD AUTO: 4.59 M/MM3 (ref 4.2–5.4)
SODIUM SPEC-SCNC: 136 MMOL/L (ref 136–145)
WBC # BLD AUTO: 12.6 K/MM3 (ref 4.8–10.8)

## 2023-03-03 PROCEDURE — 83036 HEMOGLOBIN GLYCOSYLATED A1C: CPT

## 2023-03-03 PROCEDURE — 82040 ASSAY OF SERUM ALBUMIN: CPT

## 2023-03-03 PROCEDURE — 80048 BASIC METABOLIC PNL TOTAL CA: CPT

## 2023-03-03 PROCEDURE — U0005 INFEC AGEN DETEC AMPLI PROBE: HCPCS

## 2023-03-03 PROCEDURE — 85025 COMPLETE CBC W/AUTO DIFF WBC: CPT

## 2023-03-03 PROCEDURE — U0003 INFECTIOUS AGENT DETECTION BY NUCLEIC ACID (DNA OR RNA); SEVERE ACUTE RESPIRATORY SYNDROME CORONAVIRUS 2 (SARS-COV-2) (CORONAVIRUS DISEASE [COVID-19]), AMPLIFIED PROBE TECHNIQUE, MAKING USE OF HIGH THROUGHPUT TECHNOLOGIES AS DESCRIBED BY CMS-2020-01-R: HCPCS

## 2023-03-03 PROCEDURE — C9803 HOPD COVID-19 SPEC COLLECT: HCPCS

## 2023-03-03 PROCEDURE — 36415 COLL VENOUS BLD VENIPUNCTURE: CPT

## 2023-04-05 ENCOUNTER — HOSPITAL ENCOUNTER (OUTPATIENT)
Age: 53
End: 2023-04-05
Payer: COMMERCIAL

## 2023-04-05 VITALS
OXYGEN SATURATION: 96 % | HEART RATE: 112 BPM | SYSTOLIC BLOOD PRESSURE: 150 MMHG | RESPIRATION RATE: 20 BRPM | DIASTOLIC BLOOD PRESSURE: 92 MMHG

## 2023-04-05 VITALS — BODY MASS INDEX: 39.2 KG/M2

## 2023-04-05 DIAGNOSIS — M48.02: ICD-10-CM

## 2023-04-05 DIAGNOSIS — M79.601: ICD-10-CM

## 2023-04-05 DIAGNOSIS — M50.10: Primary | ICD-10-CM

## 2023-04-05 DIAGNOSIS — M51.36: ICD-10-CM

## 2023-04-05 DIAGNOSIS — M47.816: ICD-10-CM

## 2023-04-05 DIAGNOSIS — M79.602: ICD-10-CM

## 2023-04-05 PROCEDURE — G0463 HOSPITAL OUTPT CLINIC VISIT: HCPCS

## 2023-04-05 PROCEDURE — 99202 OFFICE O/P NEW SF 15 MIN: CPT

## 2023-04-10 ENCOUNTER — HOSPITAL ENCOUNTER (OUTPATIENT)
Age: 53
End: 2023-04-10
Payer: COMMERCIAL

## 2023-04-10 DIAGNOSIS — R11.10: Primary | ICD-10-CM

## 2023-04-10 PROCEDURE — A9541 TC99M SULFUR COLLOID: HCPCS

## 2023-04-10 PROCEDURE — 78264 GASTRIC EMPTYING IMG STUDY: CPT

## 2023-04-11 ENCOUNTER — HOSPITAL ENCOUNTER (OUTPATIENT)
Age: 53
Discharge: HOME | End: 2023-04-11
Payer: COMMERCIAL

## 2023-04-11 VITALS
RESPIRATION RATE: 18 BRPM | DIASTOLIC BLOOD PRESSURE: 100 MMHG | HEART RATE: 90 BPM | SYSTOLIC BLOOD PRESSURE: 178 MMHG | OXYGEN SATURATION: 98 %

## 2023-04-11 VITALS
SYSTOLIC BLOOD PRESSURE: 151 MMHG | OXYGEN SATURATION: 97 % | HEART RATE: 86 BPM | RESPIRATION RATE: 18 BRPM | DIASTOLIC BLOOD PRESSURE: 90 MMHG

## 2023-04-11 VITALS
SYSTOLIC BLOOD PRESSURE: 151 MMHG | RESPIRATION RATE: 18 BRPM | OXYGEN SATURATION: 97 % | HEART RATE: 100 BPM | TEMPERATURE: 97.9 F | DIASTOLIC BLOOD PRESSURE: 92 MMHG

## 2023-04-11 VITALS — BODY MASS INDEX: 37.8 KG/M2

## 2023-04-11 DIAGNOSIS — M50.123: Primary | ICD-10-CM

## 2023-04-11 DIAGNOSIS — M48.02: ICD-10-CM

## 2023-04-11 PROCEDURE — 62321 NJX INTERLAMINAR CRV/THRC: CPT

## 2023-05-22 ENCOUNTER — HOSPITAL ENCOUNTER (OUTPATIENT)
Age: 53
Discharge: HOME | End: 2023-05-22
Payer: COMMERCIAL

## 2023-05-22 VITALS
HEART RATE: 95 BPM | RESPIRATION RATE: 18 BRPM | DIASTOLIC BLOOD PRESSURE: 83 MMHG | SYSTOLIC BLOOD PRESSURE: 130 MMHG | OXYGEN SATURATION: 97 %

## 2023-05-22 VITALS — BODY MASS INDEX: 37.3 KG/M2

## 2023-05-22 DIAGNOSIS — M48.02: ICD-10-CM

## 2023-05-22 DIAGNOSIS — M50.123: Primary | ICD-10-CM

## 2023-05-22 DIAGNOSIS — M79.18: ICD-10-CM

## 2023-05-22 PROCEDURE — G0463 HOSPITAL OUTPT CLINIC VISIT: HCPCS

## 2023-05-22 PROCEDURE — 99212 OFFICE O/P EST SF 10 MIN: CPT

## 2023-05-23 ENCOUNTER — HOSPITAL ENCOUNTER (OUTPATIENT)
Dept: GENERAL RADIOLOGY | Facility: HOSPITAL | Age: 53
Discharge: HOME | End: 2023-05-23
Payer: COMMERCIAL

## 2023-05-23 ENCOUNTER — PRE-ADMISSION TESTING (OUTPATIENT)
Dept: PREADMISSION TESTING | Facility: HOSPITAL | Age: 53
End: 2023-05-23
Payer: COMMERCIAL

## 2023-05-23 ENCOUNTER — ANESTHESIA EVENT (OUTPATIENT)
Dept: PERIOP | Facility: HOSPITAL | Age: 53
DRG: 331 | End: 2023-05-23
Payer: COMMERCIAL

## 2023-05-23 VITALS — WEIGHT: 232.59 LBS | BODY MASS INDEX: 36.51 KG/M2 | HEIGHT: 67 IN

## 2023-05-23 DIAGNOSIS — Z01.89 LABORATORY TEST: Primary | ICD-10-CM

## 2023-05-23 LAB
ABO GROUP BLD: NORMAL
ALBUMIN SERPL-MCNC: 4 G/DL (ref 3.5–5.2)
ALBUMIN/GLOB SERPL: 1.5 G/DL
ALP SERPL-CCNC: 175 U/L (ref 39–117)
ALT SERPL W P-5'-P-CCNC: 18 U/L (ref 1–33)
ANION GAP SERPL CALCULATED.3IONS-SCNC: 11 MMOL/L (ref 5–15)
AST SERPL-CCNC: 15 U/L (ref 1–32)
BILIRUB SERPL-MCNC: <0.2 MG/DL (ref 0–1.2)
BUN SERPL-MCNC: 11 MG/DL (ref 6–20)
BUN/CREAT SERPL: 16.7 (ref 7–25)
CALCIUM SPEC-SCNC: 9.9 MG/DL (ref 8.6–10.5)
CHLORIDE SERPL-SCNC: 104 MMOL/L (ref 98–107)
CO2 SERPL-SCNC: 25 MMOL/L (ref 22–29)
CREAT SERPL-MCNC: 0.66 MG/DL (ref 0.57–1)
DEPRECATED RDW RBC AUTO: 46.3 FL (ref 37–54)
EGFRCR SERPLBLD CKD-EPI 2021: 106.4 ML/MIN/1.73
ERYTHROCYTE [DISTWIDTH] IN BLOOD BY AUTOMATED COUNT: 14.3 % (ref 12.3–15.4)
GLOBULIN UR ELPH-MCNC: 2.6 GM/DL
GLUCOSE SERPL-MCNC: 105 MG/DL (ref 65–99)
HBA1C MFR BLD: 6.1 % (ref 4.8–5.6)
HCT VFR BLD AUTO: 39.5 % (ref 34–46.6)
HGB BLD-MCNC: 12.4 G/DL (ref 12–15.9)
MCH RBC QN AUTO: 27.6 PG (ref 26.6–33)
MCHC RBC AUTO-ENTMCNC: 31.4 G/DL (ref 31.5–35.7)
MCV RBC AUTO: 88 FL (ref 79–97)
PLATELET # BLD AUTO: 415 10*3/MM3 (ref 140–450)
PMV BLD AUTO: 8.6 FL (ref 6–12)
POTASSIUM SERPL-SCNC: 4.5 MMOL/L (ref 3.5–5.2)
PROT SERPL-MCNC: 6.6 G/DL (ref 6–8.5)
QT INTERVAL: 394 MS
QTC INTERVAL: 471 MS
RBC # BLD AUTO: 4.49 10*6/MM3 (ref 3.77–5.28)
RH BLD: POSITIVE
SODIUM SERPL-SCNC: 140 MMOL/L (ref 136–145)
WBC NRBC COR # BLD: 11.89 10*3/MM3 (ref 3.4–10.8)

## 2023-05-23 PROCEDURE — 86900 BLOOD TYPING SEROLOGIC ABO: CPT

## 2023-05-23 PROCEDURE — 93005 ELECTROCARDIOGRAM TRACING: CPT

## 2023-05-23 PROCEDURE — 85027 COMPLETE CBC AUTOMATED: CPT

## 2023-05-23 PROCEDURE — 86901 BLOOD TYPING SEROLOGIC RH(D): CPT

## 2023-05-23 PROCEDURE — 83036 HEMOGLOBIN GLYCOSYLATED A1C: CPT

## 2023-05-23 PROCEDURE — 71046 X-RAY EXAM CHEST 2 VIEWS: CPT

## 2023-05-23 PROCEDURE — 80053 COMPREHEN METABOLIC PANEL: CPT

## 2023-05-23 PROCEDURE — 36415 COLL VENOUS BLD VENIPUNCTURE: CPT

## 2023-05-23 RX ORDER — SPIRONOLACTONE 25 MG/1
25 TABLET ORAL DAILY
COMMUNITY
Start: 2023-05-22

## 2023-05-23 RX ORDER — MIRABEGRON 50 MG/1
50 TABLET, FILM COATED, EXTENDED RELEASE ORAL DAILY
COMMUNITY
Start: 2023-05-02

## 2023-05-23 RX ORDER — MONTELUKAST SODIUM 10 MG/1
10 TABLET ORAL NIGHTLY
COMMUNITY
Start: 2023-05-02

## 2023-05-23 RX ORDER — BACLOFEN 5 MG/1
5 TABLET ORAL NIGHTLY PRN
COMMUNITY
Start: 2023-05-22

## 2023-05-23 RX ORDER — ALBUTEROL SULFATE 90 UG/1
2 AEROSOL, METERED RESPIRATORY (INHALATION) EVERY 6 HOURS PRN
COMMUNITY
Start: 2023-03-09

## 2023-05-23 RX ORDER — LOSARTAN POTASSIUM 100 MG/1
100 TABLET ORAL DAILY
COMMUNITY

## 2023-05-23 RX ORDER — TRAMADOL HYDROCHLORIDE 50 MG/1
50 TABLET ORAL EVERY 8 HOURS PRN
COMMUNITY
Start: 2023-03-28

## 2023-05-23 RX ORDER — MELOXICAM 7.5 MG/1
7.5 TABLET ORAL DAILY
COMMUNITY
Start: 2023-05-02

## 2023-05-23 RX ORDER — FAMOTIDINE 40 MG/1
40 TABLET, FILM COATED ORAL 2 TIMES DAILY
COMMUNITY

## 2023-05-23 RX ORDER — NEOMYCIN SULFATE 500 MG/1
TABLET ORAL
COMMUNITY
Start: 2023-02-01

## 2023-05-23 RX ORDER — METRONIDAZOLE 500 MG/1
TABLET ORAL
COMMUNITY
Start: 2023-02-01

## 2023-05-23 NOTE — PAT
"An arrival time for procedure was not provided during PAT visit. If patient had any questions or concerns about their arrival time, they were instructed to contact their surgeon/physician.  Additionally, if the patient referred to an arrival time that was acquired from their my chart account, patient was encouraged to verify that time with their surgeon/physician. Arrival times are NOT provided in Pre Admission Testing Department.    Patient instructed to drink 20 ounces of Gatorade and it needs to be completed 1 hour (for Main OR patients) or 2 hours (scheduled  section & [facility] patients) before given arrival time for procedure (NO RED Gatorade)    Patient verbalized understanding.    Patient to apply Chlorhexadine wipes  to surgical area (as instructed) the night before procedure and the AM of procedure. Wipes provided.    Ten (8 ounce) Impact Advanced Recovery Nutritional Drinks distributed to patient from Pre Admission Testing Department.  Verbal and written instructions given that patient must consume two (8 ounce) Impact drinks a day for five days before surgery.  Flavoring tip sheet provided as well the brochure \"Go in Stronger. Get Home Sooner\" that explains benefits of nutritional drinks to enhance recovery. Patient/family verbalized understanding.     Patient viewed general PAT education video as instructed in their preoperative information received from their surgeon.  Patient stated the general PAT education video was viewed in its entirety and survey completed.  Copies of PAT general education handouts (Incentive Spirometry, Meds to Beds Program, Patient Belongings, Pre-op skin preparation instructions, Blood Glucose testing, Visitor policy, Surgery FAQ, Code H) distributed to patient if not printed. Education related to the PAT pass and skin preparation for surgery (if applicable) completed in PAT as a reinforcement to PAT education video. Patient instructed to return PAT pass provided today " as well as completed skin preparation sheet (if applicable) on the day of procedure.     Additionally if patient had not viewed video yet but intended to view it at home or in our waiting area, then referred them to the handout with QR code/link provided during PAT visit.  Instructed patient to complete survey after viewing the video in its entirety.  Encouraged patient/family to read PAT general education handouts thoroughly and notify PAT staff with any questions or concerns. Patient verbalized understanding of all information and priority content.    Patient directed to Radiology Department for CXR after Pre Admission Testing Appointment.     CARDIAC RISK ASSESSMENT REQUIRED BY DR. CAO. DR. CAO ALSO REQUESTED LAST CARDIOLOGY NOTE. REPORTED ABOVE TO KYLEE MADRID IN DR. ROSA'S OFFICE. KYLEE TO FAX TO PAT WHEN OBTAINED. PT SEE'S DR. CUMMINS. LAST SEEN 4/2023 PER PT.

## 2023-05-25 NOTE — PAT
Verified patient previously completed cardiology visit for cardiac risk assessment in preparation for upcoming procedure, completion of 12-lead ECG within six months, and risk assessment letter reviewed. No further interventions required.       Clearance letter dated 5/25/23 from Dr Stewart.

## 2023-05-30 ENCOUNTER — ANESTHESIA EVENT CONVERTED (OUTPATIENT)
Dept: ANESTHESIOLOGY | Facility: HOSPITAL | Age: 53
DRG: 331 | End: 2023-05-30
Payer: COMMERCIAL

## 2023-05-30 ENCOUNTER — ANESTHESIA (OUTPATIENT)
Dept: PERIOP | Facility: HOSPITAL | Age: 53
DRG: 331 | End: 2023-05-30
Payer: COMMERCIAL

## 2023-05-30 ENCOUNTER — HOSPITAL ENCOUNTER (INPATIENT)
Facility: HOSPITAL | Age: 53
LOS: 2 days | Discharge: HOME | DRG: 331 | End: 2023-06-01
Attending: STUDENT IN AN ORGANIZED HEALTH CARE EDUCATION/TRAINING PROGRAM | Admitting: STUDENT IN AN ORGANIZED HEALTH CARE EDUCATION/TRAINING PROGRAM
Payer: COMMERCIAL

## 2023-05-30 DIAGNOSIS — Z98.890 H/O OF RECTOPEXY: Primary | ICD-10-CM

## 2023-05-30 PROBLEM — I10 HTN (HYPERTENSION): Status: ACTIVE | Noted: 2023-05-30

## 2023-05-30 PROBLEM — K62.3 RECTAL PROLAPSE: Status: ACTIVE | Noted: 2023-05-30

## 2023-05-30 PROBLEM — R73.03 PREDIABETES: Status: ACTIVE | Noted: 2023-05-30

## 2023-05-30 PROBLEM — K21.9 GERD (GASTROESOPHAGEAL REFLUX DISEASE): Status: ACTIVE | Noted: 2023-05-30

## 2023-05-30 PROBLEM — G47.30 SLEEP APNEA: Status: ACTIVE | Noted: 2023-05-30

## 2023-05-30 LAB
GLUCOSE BLDC GLUCOMTR-MCNC: 212 MG/DL (ref 70–130)
GLUCOSE BLDC GLUCOMTR-MCNC: 249 MG/DL (ref 70–130)

## 2023-05-30 PROCEDURE — 0DQP4ZZ REPAIR RECTUM, PERCUTANEOUS ENDOSCOPIC APPROACH: ICD-10-PCS | Performed by: STUDENT IN AN ORGANIZED HEALTH CARE EDUCATION/TRAINING PROGRAM

## 2023-05-30 PROCEDURE — 25010000002 FENTANYL CITRATE (PF) 50 MCG/ML SOLUTION

## 2023-05-30 PROCEDURE — 8E0W4CZ ROBOTIC ASSISTED PROCEDURE OF TRUNK REGION, PERCUTANEOUS ENDOSCOPIC APPROACH: ICD-10-PCS | Performed by: STUDENT IN AN ORGANIZED HEALTH CARE EDUCATION/TRAINING PROGRAM

## 2023-05-30 PROCEDURE — 82948 REAGENT STRIP/BLOOD GLUCOSE: CPT

## 2023-05-30 PROCEDURE — 25010000002 PROPOFOL 10 MG/ML EMULSION: Performed by: NURSE ANESTHETIST, CERTIFIED REGISTERED

## 2023-05-30 PROCEDURE — 25010000002 DEXAMETHASONE SODIUM PHOSPHATE 10 MG/ML SOLUTION: Performed by: NURSE ANESTHETIST, CERTIFIED REGISTERED

## 2023-05-30 PROCEDURE — 63710000001 INSULIN LISPRO (HUMAN) PER 5 UNITS

## 2023-05-30 PROCEDURE — 25010000002 KETOROLAC TROMETHAMINE PER 15 MG: Performed by: STUDENT IN AN ORGANIZED HEALTH CARE EDUCATION/TRAINING PROGRAM

## 2023-05-30 PROCEDURE — 25010000002 HYDROMORPHONE 1 MG/ML SOLUTION

## 2023-05-30 PROCEDURE — 25010000002 FENTANYL CITRATE (PF) 100 MCG/2ML SOLUTION: Performed by: NURSE ANESTHETIST, CERTIFIED REGISTERED

## 2023-05-30 PROCEDURE — 25010000002 SUGAMMADEX 200 MG/2ML SOLUTION: Performed by: NURSE ANESTHETIST, CERTIFIED REGISTERED

## 2023-05-30 PROCEDURE — 25010000002 HEPARIN (PORCINE) PER 1000 UNITS: Performed by: STUDENT IN AN ORGANIZED HEALTH CARE EDUCATION/TRAINING PROGRAM

## 2023-05-30 PROCEDURE — 25010000002 ONDANSETRON PER 1 MG: Performed by: NURSE ANESTHETIST, CERTIFIED REGISTERED

## 2023-05-30 PROCEDURE — 25010000002 ERTAPENEM PER 500 MG: Performed by: NURSE ANESTHETIST, CERTIFIED REGISTERED

## 2023-05-30 PROCEDURE — 25010000002 MORPHINE PER 10 MG: Performed by: STUDENT IN AN ORGANIZED HEALTH CARE EDUCATION/TRAINING PROGRAM

## 2023-05-30 RX ORDER — TRAMADOL HYDROCHLORIDE 50 MG/1
50 TABLET ORAL EVERY 6 HOURS PRN
Status: DISCONTINUED | OUTPATIENT
Start: 2023-05-30 | End: 2023-06-01 | Stop reason: HOSPADM

## 2023-05-30 RX ORDER — SCOLOPAMINE TRANSDERMAL SYSTEM 1 MG/1
1 PATCH, EXTENDED RELEASE TRANSDERMAL CONTINUOUS
Status: DISCONTINUED | OUTPATIENT
Start: 2023-05-30 | End: 2023-06-01 | Stop reason: HOSPADM

## 2023-05-30 RX ORDER — FAMOTIDINE 20 MG/1
40 TABLET, FILM COATED ORAL 2 TIMES DAILY
Status: DISCONTINUED | OUTPATIENT
Start: 2023-05-30 | End: 2023-06-01 | Stop reason: HOSPADM

## 2023-05-30 RX ORDER — DIAZEPAM 5 MG/1
5 TABLET ORAL EVERY 6 HOURS PRN
Status: DISCONTINUED | OUTPATIENT
Start: 2023-05-30 | End: 2023-06-01 | Stop reason: HOSPADM

## 2023-05-30 RX ORDER — CETIRIZINE HYDROCHLORIDE 10 MG/1
10 TABLET ORAL DAILY
Status: DISCONTINUED | OUTPATIENT
Start: 2023-05-30 | End: 2023-06-01 | Stop reason: HOSPADM

## 2023-05-30 RX ORDER — FENTANYL CITRATE 50 UG/ML
INJECTION, SOLUTION INTRAMUSCULAR; INTRAVENOUS
Status: COMPLETED
Start: 2023-05-30 | End: 2023-05-30

## 2023-05-30 RX ORDER — MORPHINE SULFATE 2 MG/ML
2 INJECTION, SOLUTION INTRAMUSCULAR; INTRAVENOUS EVERY 4 HOURS PRN
Status: DISCONTINUED | OUTPATIENT
Start: 2023-05-30 | End: 2023-06-01 | Stop reason: HOSPADM

## 2023-05-30 RX ORDER — MIDAZOLAM HYDROCHLORIDE 1 MG/ML
1 INJECTION INTRAMUSCULAR; INTRAVENOUS
Status: DISCONTINUED | OUTPATIENT
Start: 2023-05-30 | End: 2023-05-30 | Stop reason: HOSPADM

## 2023-05-30 RX ORDER — LIDOCAINE HYDROCHLORIDE 10 MG/ML
0.5 INJECTION, SOLUTION EPIDURAL; INFILTRATION; INTRACAUDAL; PERINEURAL ONCE AS NEEDED
Status: COMPLETED | OUTPATIENT
Start: 2023-05-30 | End: 2023-05-30

## 2023-05-30 RX ORDER — PREGABALIN 75 MG/1
75 CAPSULE ORAL ONCE
Status: COMPLETED | OUTPATIENT
Start: 2023-05-30 | End: 2023-05-30

## 2023-05-30 RX ORDER — SODIUM CHLORIDE, SODIUM LACTATE, POTASSIUM CHLORIDE, CALCIUM CHLORIDE 600; 310; 30; 20 MG/100ML; MG/100ML; MG/100ML; MG/100ML
9 INJECTION, SOLUTION INTRAVENOUS CONTINUOUS
Status: DISCONTINUED | OUTPATIENT
Start: 2023-05-30 | End: 2023-05-30

## 2023-05-30 RX ORDER — BUPIVACAINE HYDROCHLORIDE 2.5 MG/ML
INJECTION, SOLUTION EPIDURAL; INFILTRATION; INTRACAUDAL AS NEEDED
Status: DISCONTINUED | OUTPATIENT
Start: 2023-05-30 | End: 2023-05-30

## 2023-05-30 RX ORDER — ONDANSETRON 2 MG/ML
4 INJECTION INTRAMUSCULAR; INTRAVENOUS EVERY 6 HOURS PRN
Status: DISCONTINUED | OUTPATIENT
Start: 2023-05-30 | End: 2023-06-01 | Stop reason: HOSPADM

## 2023-05-30 RX ORDER — LOSARTAN POTASSIUM 50 MG/1
100 TABLET ORAL
Status: DISCONTINUED | OUTPATIENT
Start: 2023-05-30 | End: 2023-06-01 | Stop reason: HOSPADM

## 2023-05-30 RX ORDER — ENOXAPARIN SODIUM 100 MG/ML
40 INJECTION SUBCUTANEOUS DAILY
Status: DISCONTINUED | OUTPATIENT
Start: 2023-05-31 | End: 2023-06-01 | Stop reason: HOSPADM

## 2023-05-30 RX ORDER — KETOROLAC TROMETHAMINE 30 MG/ML
30 INJECTION, SOLUTION INTRAMUSCULAR; INTRAVENOUS EVERY 6 HOURS
Status: COMPLETED | OUTPATIENT
Start: 2023-05-30 | End: 2023-06-01

## 2023-05-30 RX ORDER — DEXTROSE MONOHYDRATE 25 G/50ML
25 INJECTION, SOLUTION INTRAVENOUS
Status: DISCONTINUED | OUTPATIENT
Start: 2023-05-30 | End: 2023-06-01 | Stop reason: HOSPADM

## 2023-05-30 RX ORDER — ONDANSETRON 2 MG/ML
INJECTION INTRAMUSCULAR; INTRAVENOUS AS NEEDED
Status: DISCONTINUED | OUTPATIENT
Start: 2023-05-30 | End: 2023-05-30 | Stop reason: SURG

## 2023-05-30 RX ORDER — BUPIVACAINE HYDROCHLORIDE 2.5 MG/ML
INJECTION, SOLUTION EPIDURAL; INFILTRATION; INTRACAUDAL
Status: COMPLETED | OUTPATIENT
Start: 2023-05-30 | End: 2023-05-30

## 2023-05-30 RX ORDER — ALVIMOPAN 12 MG/1
12 CAPSULE ORAL 2 TIMES DAILY
Status: DISCONTINUED | OUTPATIENT
Start: 2023-05-31 | End: 2023-05-31

## 2023-05-30 RX ORDER — MAGNESIUM HYDROXIDE 1200 MG/15ML
LIQUID ORAL AS NEEDED
Status: DISCONTINUED | OUTPATIENT
Start: 2023-05-30 | End: 2023-05-30 | Stop reason: HOSPADM

## 2023-05-30 RX ORDER — BACLOFEN 10 MG/1
5 TABLET ORAL EVERY 8 HOURS SCHEDULED
Status: DISCONTINUED | OUTPATIENT
Start: 2023-05-30 | End: 2023-06-01 | Stop reason: HOSPADM

## 2023-05-30 RX ORDER — FAMOTIDINE 20 MG/1
20 TABLET, FILM COATED ORAL ONCE
Status: COMPLETED | OUTPATIENT
Start: 2023-05-30 | End: 2023-05-30

## 2023-05-30 RX ORDER — NALOXONE HCL 0.4 MG/ML
0.4 VIAL (ML) INJECTION
Status: DISCONTINUED | OUTPATIENT
Start: 2023-05-30 | End: 2023-06-01 | Stop reason: HOSPADM

## 2023-05-30 RX ORDER — MELOXICAM 15 MG/1
15 TABLET ORAL ONCE
Status: COMPLETED | OUTPATIENT
Start: 2023-05-30 | End: 2023-05-30

## 2023-05-30 RX ORDER — ALVIMOPAN 12 MG/1
12 CAPSULE ORAL ONCE
Status: COMPLETED | OUTPATIENT
Start: 2023-05-30 | End: 2023-05-30

## 2023-05-30 RX ORDER — ALBUTEROL SULFATE 2.5 MG/3ML
2.5 SOLUTION RESPIRATORY (INHALATION) EVERY 6 HOURS PRN
Status: DISCONTINUED | OUTPATIENT
Start: 2023-05-30 | End: 2023-06-01 | Stop reason: HOSPADM

## 2023-05-30 RX ORDER — SODIUM CHLORIDE 0.9 % (FLUSH) 0.9 %
10 SYRINGE (ML) INJECTION EVERY 12 HOURS SCHEDULED
Status: DISCONTINUED | OUTPATIENT
Start: 2023-05-30 | End: 2023-05-30 | Stop reason: HOSPADM

## 2023-05-30 RX ORDER — SODIUM CHLORIDE 9 MG/ML
40 INJECTION, SOLUTION INTRAVENOUS AS NEEDED
Status: DISCONTINUED | OUTPATIENT
Start: 2023-05-30 | End: 2023-05-30 | Stop reason: HOSPADM

## 2023-05-30 RX ORDER — FLUTICASONE PROPIONATE 50 MCG
2 SPRAY, SUSPENSION (ML) NASAL DAILY PRN
Status: DISCONTINUED | OUTPATIENT
Start: 2023-05-30 | End: 2023-06-01 | Stop reason: HOSPADM

## 2023-05-30 RX ORDER — FENTANYL CITRATE 50 UG/ML
INJECTION, SOLUTION INTRAMUSCULAR; INTRAVENOUS AS NEEDED
Status: DISCONTINUED | OUTPATIENT
Start: 2023-05-30 | End: 2023-05-30 | Stop reason: SURG

## 2023-05-30 RX ORDER — ACETAMINOPHEN 500 MG
1000 TABLET ORAL ONCE
Status: COMPLETED | OUTPATIENT
Start: 2023-05-30 | End: 2023-05-30

## 2023-05-30 RX ORDER — DEXAMETHASONE SODIUM PHOSPHATE 10 MG/ML
INJECTION, SOLUTION INTRAMUSCULAR; INTRAVENOUS AS NEEDED
Status: DISCONTINUED | OUTPATIENT
Start: 2023-05-30 | End: 2023-05-30 | Stop reason: SURG

## 2023-05-30 RX ORDER — ROCURONIUM BROMIDE 10 MG/ML
INJECTION, SOLUTION INTRAVENOUS AS NEEDED
Status: DISCONTINUED | OUTPATIENT
Start: 2023-05-30 | End: 2023-05-30 | Stop reason: SURG

## 2023-05-30 RX ORDER — POLYETHYLENE GLYCOL 3350 17 G/17G
17 POWDER, FOR SOLUTION ORAL DAILY
Status: DISCONTINUED | OUTPATIENT
Start: 2023-05-30 | End: 2023-06-01 | Stop reason: HOSPADM

## 2023-05-30 RX ORDER — OXYCODONE HYDROCHLORIDE 5 MG/1
5 TABLET ORAL EVERY 4 HOURS PRN
Status: DISCONTINUED | OUTPATIENT
Start: 2023-05-30 | End: 2023-06-01 | Stop reason: HOSPADM

## 2023-05-30 RX ORDER — NICOTINE POLACRILEX 4 MG
15 LOZENGE BUCCAL
Status: DISCONTINUED | OUTPATIENT
Start: 2023-05-30 | End: 2023-06-01 | Stop reason: HOSPADM

## 2023-05-30 RX ORDER — ACETAMINOPHEN 500 MG
1000 TABLET ORAL EVERY 6 HOURS
Status: DISCONTINUED | OUTPATIENT
Start: 2023-05-30 | End: 2023-06-01 | Stop reason: HOSPADM

## 2023-05-30 RX ORDER — PROPOFOL 10 MG/ML
VIAL (ML) INTRAVENOUS AS NEEDED
Status: DISCONTINUED | OUTPATIENT
Start: 2023-05-30 | End: 2023-05-30 | Stop reason: SURG

## 2023-05-30 RX ORDER — FENTANYL CITRATE 50 UG/ML
50 INJECTION, SOLUTION INTRAMUSCULAR; INTRAVENOUS
Status: DISCONTINUED | OUTPATIENT
Start: 2023-05-30 | End: 2023-05-30 | Stop reason: HOSPADM

## 2023-05-30 RX ORDER — VENLAFAXINE HYDROCHLORIDE 75 MG/1
150 CAPSULE, EXTENDED RELEASE ORAL DAILY
Status: DISCONTINUED | OUTPATIENT
Start: 2023-05-30 | End: 2023-06-01 | Stop reason: HOSPADM

## 2023-05-30 RX ORDER — SODIUM CHLORIDE 0.9 % (FLUSH) 0.9 %
10 SYRINGE (ML) INJECTION AS NEEDED
Status: DISCONTINUED | OUTPATIENT
Start: 2023-05-30 | End: 2023-05-30 | Stop reason: HOSPADM

## 2023-05-30 RX ORDER — LIDOCAINE HYDROCHLORIDE 10 MG/ML
INJECTION, SOLUTION EPIDURAL; INFILTRATION; INTRACAUDAL; PERINEURAL AS NEEDED
Status: DISCONTINUED | OUTPATIENT
Start: 2023-05-30 | End: 2023-05-30 | Stop reason: SURG

## 2023-05-30 RX ORDER — HEPARIN SODIUM 5000 [USP'U]/ML
5000 INJECTION, SOLUTION INTRAVENOUS; SUBCUTANEOUS ONCE
Status: COMPLETED | OUTPATIENT
Start: 2023-05-30 | End: 2023-05-30

## 2023-05-30 RX ORDER — DEXTROSE, SODIUM CHLORIDE, AND POTASSIUM CHLORIDE 5; .45; .15 G/100ML; G/100ML; G/100ML
75 INJECTION INTRAVENOUS CONTINUOUS
Status: DISCONTINUED | OUTPATIENT
Start: 2023-05-30 | End: 2023-05-31

## 2023-05-30 RX ORDER — INSULIN LISPRO 100 [IU]/ML
2-7 INJECTION, SOLUTION INTRAVENOUS; SUBCUTANEOUS
Status: DISCONTINUED | OUTPATIENT
Start: 2023-05-30 | End: 2023-06-01 | Stop reason: HOSPADM

## 2023-05-30 RX ORDER — DEXAMETHASONE SODIUM PHOSPHATE 10 MG/ML
INJECTION, SOLUTION INTRAMUSCULAR; INTRAVENOUS
Status: COMPLETED | OUTPATIENT
Start: 2023-05-30 | End: 2023-05-30

## 2023-05-30 RX ORDER — MONTELUKAST SODIUM 10 MG/1
10 TABLET ORAL NIGHTLY
Status: DISCONTINUED | OUTPATIENT
Start: 2023-05-30 | End: 2023-06-01 | Stop reason: HOSPADM

## 2023-05-30 RX ORDER — SPIRONOLACTONE 25 MG/1
25 TABLET ORAL DAILY
Status: DISCONTINUED | OUTPATIENT
Start: 2023-05-30 | End: 2023-06-01 | Stop reason: HOSPADM

## 2023-05-30 RX ORDER — FAMOTIDINE 10 MG/ML
20 INJECTION, SOLUTION INTRAVENOUS ONCE
Status: DISCONTINUED | OUTPATIENT
Start: 2023-05-30 | End: 2023-05-30 | Stop reason: HOSPADM

## 2023-05-30 RX ADMIN — LOSARTAN POTASSIUM 100 MG: 50 TABLET, FILM COATED ORAL at 17:40

## 2023-05-30 RX ADMIN — MONTELUKAST 10 MG: 10 TABLET, FILM COATED ORAL at 20:23

## 2023-05-30 RX ADMIN — ACETAMINOPHEN 1000 MG: 500 TABLET ORAL at 16:56

## 2023-05-30 RX ADMIN — ROCURONIUM BROMIDE 20 MG: 10 SOLUTION INTRAVENOUS at 12:06

## 2023-05-30 RX ADMIN — SODIUM CHLORIDE, POTASSIUM CHLORIDE, SODIUM LACTATE AND CALCIUM CHLORIDE: 600; 310; 30; 20 INJECTION, SOLUTION INTRAVENOUS at 13:00

## 2023-05-30 RX ADMIN — FAMOTIDINE 20 MG: 20 TABLET ORAL at 10:41

## 2023-05-30 RX ADMIN — FENTANYL CITRATE 50 MCG: 50 INJECTION, SOLUTION INTRAMUSCULAR; INTRAVENOUS at 14:00

## 2023-05-30 RX ADMIN — ROCURONIUM BROMIDE 50 MG: 10 SOLUTION INTRAVENOUS at 11:33

## 2023-05-30 RX ADMIN — INSULIN LISPRO 3 UNITS: 100 INJECTION, SOLUTION INTRAVENOUS; SUBCUTANEOUS at 17:41

## 2023-05-30 RX ADMIN — DEXAMETHASONE SODIUM PHOSPHATE 4 MG: 10 INJECTION, SOLUTION INTRAMUSCULAR; INTRAVENOUS at 11:34

## 2023-05-30 RX ADMIN — CETIRIZINE HYDROCHLORIDE 10 MG: 10 TABLET, FILM COATED ORAL at 16:56

## 2023-05-30 RX ADMIN — ACETAMINOPHEN 1000 MG: 500 TABLET ORAL at 21:56

## 2023-05-30 RX ADMIN — POLYETHYLENE GLYCOL 3350 17 G: 17 POWDER, FOR SOLUTION ORAL at 16:58

## 2023-05-30 RX ADMIN — PROPOFOL 25 MCG/KG/MIN: 10 INJECTION, EMULSION INTRAVENOUS at 11:35

## 2023-05-30 RX ADMIN — Medication 1 G: at 11:27

## 2023-05-30 RX ADMIN — SPIRONOLACTONE 25 MG: 25 TABLET ORAL at 16:57

## 2023-05-30 RX ADMIN — FENTANYL CITRATE 50 MCG: 50 INJECTION, SOLUTION INTRAMUSCULAR; INTRAVENOUS at 11:39

## 2023-05-30 RX ADMIN — VENLAFAXINE HYDROCHLORIDE 150 MG: 75 CAPSULE, EXTENDED RELEASE ORAL at 16:57

## 2023-05-30 RX ADMIN — FENTANYL CITRATE 50 MCG: 50 INJECTION, SOLUTION INTRAMUSCULAR; INTRAVENOUS at 12:08

## 2023-05-30 RX ADMIN — LIDOCAINE HYDROCHLORIDE 50 MG: 10 INJECTION, SOLUTION EPIDURAL; INFILTRATION; INTRACAUDAL; PERINEURAL at 11:32

## 2023-05-30 RX ADMIN — INSULIN LISPRO 3 UNITS: 100 INJECTION, SOLUTION INTRAVENOUS; SUBCUTANEOUS at 20:23

## 2023-05-30 RX ADMIN — SODIUM CHLORIDE, POTASSIUM CHLORIDE, SODIUM LACTATE AND CALCIUM CHLORIDE 9 ML/HR: 600; 310; 30; 20 INJECTION, SOLUTION INTRAVENOUS at 10:52

## 2023-05-30 RX ADMIN — PROPOFOL 250 MG: 10 INJECTION, EMULSION INTRAVENOUS at 11:32

## 2023-05-30 RX ADMIN — LIDOCAINE HYDROCHLORIDE 0.5 ML: 10 INJECTION, SOLUTION EPIDURAL; INFILTRATION; INTRACAUDAL; PERINEURAL at 10:51

## 2023-05-30 RX ADMIN — POTASSIUM CHLORIDE, DEXTROSE MONOHYDRATE AND SODIUM CHLORIDE 75 ML/HR: 150; 5; 450 INJECTION, SOLUTION INTRAVENOUS at 15:47

## 2023-05-30 RX ADMIN — MORPHINE SULFATE 2 MG: 2 INJECTION, SOLUTION INTRAMUSCULAR; INTRAVENOUS at 16:56

## 2023-05-30 RX ADMIN — ACETAMINOPHEN 1000 MG: 500 TABLET ORAL at 10:41

## 2023-05-30 RX ADMIN — MELOXICAM 15 MG: 15 TABLET ORAL at 10:41

## 2023-05-30 RX ADMIN — ONDANSETRON 4 MG: 2 INJECTION INTRAMUSCULAR; INTRAVENOUS at 13:02

## 2023-05-30 RX ADMIN — BACLOFEN 5 MG: 10 TABLET ORAL at 21:56

## 2023-05-30 RX ADMIN — PREGABALIN 75 MG: 75 CAPSULE ORAL at 10:41

## 2023-05-30 RX ADMIN — KETOROLAC TROMETHAMINE 30 MG: 30 INJECTION, SOLUTION INTRAMUSCULAR; INTRAVENOUS at 17:41

## 2023-05-30 RX ADMIN — SCOPOLAMINE 1 PATCH: 1.5 PATCH, EXTENDED RELEASE TRANSDERMAL at 10:39

## 2023-05-30 RX ADMIN — Medication 0.5 MG: at 14:10

## 2023-05-30 RX ADMIN — HYDROMORPHONE HYDROCHLORIDE 0.5 MG: 1 INJECTION, SOLUTION INTRAMUSCULAR; INTRAVENOUS; SUBCUTANEOUS at 14:10

## 2023-05-30 RX ADMIN — HEPARIN SODIUM 5000 UNITS: 5000 INJECTION INTRAVENOUS; SUBCUTANEOUS at 10:58

## 2023-05-30 RX ADMIN — FAMOTIDINE 40 MG: 20 TABLET ORAL at 20:23

## 2023-05-30 RX ADMIN — BACLOFEN 5 MG: 10 TABLET ORAL at 16:56

## 2023-05-30 RX ADMIN — BUPIVACAINE HYDROCHLORIDE 60 ML: 2.5 INJECTION, SOLUTION EPIDURAL; INFILTRATION; INTRACAUDAL; PERINEURAL at 11:34

## 2023-05-30 RX ADMIN — FENTANYL CITRATE 50 MCG: 50 INJECTION, SOLUTION INTRAMUSCULAR; INTRAVENOUS at 11:32

## 2023-05-30 RX ADMIN — FENTANYL CITRATE 50 MCG: 50 INJECTION, SOLUTION INTRAMUSCULAR; INTRAVENOUS at 12:56

## 2023-05-30 RX ADMIN — ALVIMOPAN 12 MG: 12 CAPSULE ORAL at 11:06

## 2023-05-30 RX ADMIN — DEXAMETHASONE SODIUM PHOSPHATE 6 MG: 10 INJECTION, SOLUTION INTRAMUSCULAR; INTRAVENOUS at 11:55

## 2023-05-30 RX ADMIN — SUGAMMADEX 200 MG: 100 INJECTION, SOLUTION INTRAVENOUS at 13:19

## 2023-05-30 NOTE — ANESTHESIA PROCEDURE NOTES
Peripheral Block    Pre-sedation assessment completed: 5/30/2023 11:32 AM    Patient reassessed immediately prior to procedure    Patient location during procedure: OR  Reason for block: at surgeon's request and post-op pain management  Performed by  Anesthesiologist: Francis Crenshaw MD  Preanesthetic Checklist  Completed: patient identified, IV checked, site marked, risks and benefits discussed, surgical consent, monitors and equipment checked, pre-op evaluation and timeout performed  Prep:  Pt Position: supine  Sterile barriers:cap, gloves, mask and washed/disinfected hands  Prep: ChloraPrep  Patient monitoring: blood pressure monitoring, continuous pulse oximetry and EKG  Procedure    Sedation: yes  Performed under: general  Guidance:ultrasound guided    ULTRASOUND INTERPRETATION.  Using ultrasound guidance a 20 G gauge needle was placed in close proximity to the nerve, at which point, under ultrasound guidance anesthetic was injected in the area of the nerve and spread of the anesthesia was seen on ultrasound in close proximity thereto.  There were no abnormalities seen on ultrasound; a digital image was taken; and the patient tolerated the procedure with no complications. Images:still images obtained, printed/placed on chart    Laterality:Bilateral  Block Type:TAP  Injection Technique:single-shot  Needle Type:short-bevel and echogenic  Needle Gauge:20 G  Resistance on Injection: none    Medications Used: dexamethasone sodium phosphate injection - Injection   4 mg - 5/30/2023 11:34:00 AM  bupivacaine PF (MARCAINE) 0.25 % injection - Injection   60 mL - 5/30/2023 11:34:00 AM      Medications  Comment:Block Injection:  LA dose divided between Right and Left block        Post Assessment  Injection Assessment: negative aspiration for heme, incremental injection and no paresthesia on injection  Patient Tolerance:comfortable throughout block  Complications:no  Additional Notes    Subcostal TAPs    A high-frequency  "linear transducer, with sterile cover, was placed sub-xiphoid to identify [place], right and left Rectus Abdominus Muscles (RIAN). The transducer was moved either right or left subcostally to identify the RIAN and the Transverse Abdominus Muscle (FONTAINE). The insertion site was prepped in sterile fashion and then localized with 2-5 ml of 1% Lidocaine. Using ultrasound-guidance, a 20-gauge B-Chaparro 4\" Ultraplex 360 non-stimulating echogenic needle was advanced in plane, from medial to lateral, until the tip of the needle was in the fascial plane between the RIAN and FONTAINE. 1-3ml of preservative free normal saline was used to hydro-dissect the fascial planes. After the fascial plane was verified, the local anesthetic (LA) was injected. The procedure was repeated on the opposite side for bilateral coverage. Aspiration every 5 ml to prevent intravascular injection. Injection was completed with negative aspiration of blood and negative intravascular injection. Injection pressures were normal with minimal resistance. The subcostal approach to the TAP nerve block ideally anesthetizes the intercostal nerves T6-T9.     Mid-Axillary/Lateral TAPs    A high-frequency linear transducer, with sterile cover, was placed in the midaxillary line between the ASIS and costal margin. The External Oblique Muscle (EOM), Internal Oblique Muscle (IOM), Transverse Abdominus Muscle (FONTAINE), and Peritoneum were identified. The insertion site was prepped in sterile fashion and then localized with 2-5 ml of 1% Lidocaine. Using ultrasound-guidance, a 20-gauge B-Chaparro 4\" Ultraplex 360 non-stimulating echogenic needle was advanced in plane, from medial to lateral, until the tip of the needle was in the fascial plane between the IOM and FONTAINE. 1-3ml of preservative free normal saline was used to hydro-dissect the fascial planes. After the fascial plane was verified, the local anesthetic (LA) was injected. The procedure was repeated on the opposite side for " bilateral coverage. Aspiration every 5 ml to prevent intravascular injection. Injection was completed with negative aspiration of blood and negative intravascular injection. Injection pressures were normal with minimal resistance. Midaxillary TAPs should reach intercostal nerves T10- T11 and the subcostal nerve T12.

## 2023-05-30 NOTE — H&P
Admission      Patient Name: Luanne Forrester  MRN: [id number]  : 1970  DOS: 2023    Attending: Yonatan Eric MD    Primary Care Provider: Sandee Valadez APRN      Patient Care Team:  Sandee Valadez APRN as PCP - General (Family Medicine)  Ayden Stewart MD as Consulting Physician (Interventional Cardiology)    Chief complaint: Rectal prolapse    Subjective   Patient is a pleasant 53 y.o. female presented for scheduled surgery by Dr. Eric.  She underwent rectopexy under general anesthesia.  She tolerated surgery well and was admitted for further medical management. She reports a history of rectal prolapse and anal leakage.  She denies any cardiac history or history of DVT or PE.    When seen postop, patient reports pain is tolerable.  She denies nausea, shortness of breath or chest pain.    Allergies:  No Known Allergies    Meds:  Medications Prior to Admission   Medication Sig Dispense Refill Last Dose   • albuterol sulfate  (90 Base) MCG/ACT inhaler Inhale 2 puffs Every 6 (Six) Hours As Needed for Wheezing.   Past Month   • Baclofen (LIORESAL) 5 MG tablet Take 1 tablet by mouth At Night As Needed (MUSCLE SPASMS).   Past Week   • cetirizine (zyrTEC) 10 MG tablet Take 1 tablet by mouth Daily.   Past Week   • cyanocobalamin 1000 MCG/ML injection Inject 1 mL into the appropriate muscle as directed by prescriber Every 28 (Twenty-Eight) Days.   Past Month   • famotidine (PEPCID) 40 MG tablet Take 1 tablet by mouth 2 (Two) Times a Day.   2023 at 1800   • fluticasone (FLONASE) 50 MCG/ACT nasal spray 2 sprays into the nostril(s) as directed by provider Daily As Needed for Rhinitis or Allergies.   Past Month   • losartan (COZAAR) 100 MG tablet Take 1 tablet by mouth Daily.   2023 at 1800   • meloxicam (MOBIC) 7.5 MG tablet Take 1 tablet by mouth Daily.   Past Week   • metroNIDAZOLE (FLAGYL) 500 MG tablet    2023 at 2300   • montelukast (SINGULAIR)  "10 MG tablet Take 1 tablet by mouth Every Night.   2023 at 1800   • mupirocin (BACTROBAN) 2 % ointment    Past Week   • Myrbetriq 50 MG tablet sustained-release 24 hour 24 hr tablet Take 50 mg by mouth Daily.   2023 at 1000   • neomycin (MYCIFRADIN) 500 MG tablet    2023 at 2300   • spironolactone (ALDACTONE) 25 MG tablet Take 1 tablet by mouth Daily.   Past Month   • traMADol (ULTRAM) 50 MG tablet Take 1 tablet by mouth Every 8 (Eight) Hours As Needed for Moderate Pain or Severe Pain.   Past Month   • venlafaxine XR (EFFEXOR-XR) 150 MG 24 hr capsule Take 1 capsule by mouth Daily.   2023 at 1000         History:   Past Medical History:   Diagnosis Date   • Arthritis    • Bronchitis    • GERD (gastroesophageal reflux disease)    • Hypertension    • Migraine    • Pneumonia    • Prediabetes    • Sleep apnea     CPAP     Past Surgical History:   Procedure Laterality Date   • COLONOSCOPY     • FOOT SURGERY Left 2019    Dr. Poole   • HYSTERECTOMY  2015    partial   • SPINE SURGERY  2021    Dr. Reeder   • TONSILLECTOMY     • TOTAL HIP ARTHROPLASTY Right 2022   • TOTAL HIP ARTHROPLASTY Left 2023    [facility]     Family History   Problem Relation Age of Onset   • Gout Mother    • Heart disease Mother    • Heart disease Father    • Diabetes Father    • Cancer Father    • Gout Father    • Hypertension Father      Social History     Tobacco Use   • Smoking status: Former     Packs/day: 1.50     Years: 30.00     Pack years: 45.00     Types: Cigarettes     Quit date:      Years since quittin.4   • Smokeless tobacco: Never   Vaping Use   • Vaping Use: Never used   Substance Use Topics   • Alcohol use: Never   • Drug use: Never       Review of Systems  Pertinent items are noted in HPI, all other systems reviewed and negative    Vital Signs  /86 (BP Location: Left arm, Patient Position: Lying)   Pulse 89   Temp 97.5 °F (36.4 °C) (Temporal)   Resp 16   Ht 168.9 cm (66.5\")   Wt " 105 kg (232 lb 9.4 oz)   SpO2 93%   BMI 36.98 kg/m²     Physical Exam:    General Appearance:    Alert, cooperative, in no acute distress   Head:    Normocephalic, without obvious abnormality, atraumatic   Eyes:            Lids and lashes normal, conjunctivae and sclerae normal, no   icterus, no pallor, corneas clear   Ears:    Ears appear intact with no abnormalities noted   Throat:   No oral lesions, no thrush, oral mucosa moist   Neck:   No adenopathy, supple, trachea midline, no thyromegaly         Lungs:     Clear to auscultation,respirations regular, even and       unlabored. No wheezes or rales.    Heart:    Regular rhythm and normal rate, normal S1 and S2, no murmur    Abdomen:      Lap sites CDI   Genitalia:    Deferred   Extremities:   Moves all extremities well, no edema, no cyanosis, no              redness   Pulses:   Pulses palpable and equal bilaterally   Skin:   No bleeding, bruising or rash   Neurologic:   Cranial nerves 2 - 12 grossly intact,             Invalid input(s): NEUTOPHILPCT,  EOSPCT        Invalid input(s): LABALBU, PROT  Lab Results   Component Value Date    HGBA1C 6.10 (H) 05/23/2023      Latest Reference Range & Units 05/23/23 13:56   Sodium 136 - 145 mmol/L 140   Potassium 3.5 - 5.2 mmol/L 4.5   Chloride 98 - 107 mmol/L 104   CO2 22.0 - 29.0 mmol/L 25.0   Anion Gap 5.0 - 15.0 mmol/L 11.0   BUN 6 - 20 mg/dL 11   Creatinine 0.57 - 1.00 mg/dL 0.66   BUN/Creatinine Ratio 7.0 - 25.0  16.7   eGFR >60.0 mL/min/1.73 106.4   Glucose 65 - 99 mg/dL 105 (H)   Calcium 8.6 - 10.5 mg/dL 9.9   Alkaline Phosphatase 39 - 117 U/L 175 (H)   Total Protein 6.0 - 8.5 g/dL 6.6   Albumin 3.5 - 5.2 g/dL 4.0   Globulin gm/dL 2.6   A/G Ratio g/dL 1.5   AST (SGOT) 1 - 32 U/L 15   ALT (SGPT) 1 - 33 U/L 18   Total Bilirubin 0.0 - 1.2 mg/dL <0.2   Hemoglobin A1C 4.80 - 5.60 % 6.10 (H)   WBC 3.40 - 10.80 10*3/mm3 11.89 (H)   RBC 3.77 - 5.28 10*6/mm3 4.49   Hemoglobin 12.0 - 15.9 g/dL 12.4   Hematocrit 34.0 - 46.6  % 39.5   Platelets 140 - 450 10*3/mm3 415   RDW 12.3 - 15.4 % 14.3   MCV 79.0 - 97.0 fL 88.0   MCH 26.6 - 33.0 pg 27.6   MCHC 31.5 - 35.7 g/dL 31.4 (L)   MPV 6.0 - 12.0 fL 8.6   RDW-SD 37.0 - 54.0 fl 46.3   (H): Data is abnormally high  (L): Data is abnormally low    Assessment and Plan:       s/p rectopexy    Rectal prolapse    HTN (hypertension)    GERD (gastroesophageal reflux disease)    Sleep apnea    Prediabetes      Plan  1. Ambulation  2. Pain control-prns   3. IS-encourage  4. DVT proph- Mechanical and Lovenox  5. Bowel regimen  6. Resume home medications as appropriate  7. Monitor post-op labs  8. DC planning   9. Diet, Clears, advance diet as tolerated.  IVF initially, monitor volume status.    HTN  - Continue home losartan, Aldactone  - Monitor BP   - Holding parameters for BP meds  - Labetalol PRN for SBP>170    GERD  -Pepcid    Sleep apnea  -Monitor O2 sats    PreDM  - hgb A1c on 5/23/2023 6.1  - Accu-Chek AC and HS with low dose SSI        Dragon disclaimer:  Part of this encounter note is an electronic transcription/translation of spoken language to printed text. The electronic translation of spoken language may permit erroneous, or at times, nonsensical words or phrases to be inadvertently transcribed; Although I have reviewed the note for such errors, some may still exist.    Electronically signed by GRANT Kathleen, 05/30/23, 4:13 PM EDT.

## 2023-05-30 NOTE — ANESTHESIA PROCEDURE NOTES
Airway  Urgency: elective    Date/Time: 5/30/2023 11:34 AM  Airway not difficult    General Information and Staff    Patient location during procedure: OR  CRNA/CAA: Katya Hilliard CRNA    Indications and Patient Condition  Indications for airway management: airway protection    Preoxygenated: yes  MILS not maintained throughout  Mask difficulty assessment: 1 - vent by mask    Final Airway Details  Final airway type: endotracheal airway      Successful airway: ETT  Cuffed: yes   Successful intubation technique: direct laryngoscopy  Endotracheal tube insertion site: oral  Blade: Chip  Blade size: 3  ETT size (mm): 7.0  Cormack-Lehane Classification: grade I - full view of glottis  Placement verified by: chest auscultation and capnometry   Measured from: lips  ETT/EBT  to lips (cm): 20  Number of attempts at approach: 1  Assessment: lips, teeth, and gum same as pre-op and atraumatic intubation    Additional Comments  Negative epigastric sounds, Breath sound equal bilaterally with symmetric chest rise and fall

## 2023-05-30 NOTE — ANESTHESIA POSTPROCEDURE EVALUATION
Patient: Luanne Forrester    Procedure Summary     Date: 05/30/23 Room / Location:  [facility]  [facility]    Anesthesia Start: 1124 Anesthesia Stop: 1340    Procedure: RECTOPEXY WITH DAVINCI ROBOT (Abdomen) Diagnosis:     Surgeons: Yonatan Eric MD Provider: Francis Crenshaw MD    Anesthesia Type: general ASA Status: 3          Anesthesia Type: general    Vitals  Vitals Value Taken Time   /89 05/30/23 1340   Temp 97 °F (36.1 °C) 05/30/23 1340   Pulse 89 05/30/23 1340   Resp 16 05/30/23 1340   SpO2 94 % 05/30/23 1340           Post Anesthesia Care and Evaluation    Patient location during evaluation: PACU  Patient participation: waiting for patient participation  Level of consciousness: responsive to verbal stimuli    Airway patency: patent  Anesthetic complications: No anesthetic complications  PONV Status: none  Cardiovascular status: stable  Respiratory status: spontaneous ventilation and nasal cannula  Hydration status: acceptable  No anesthesia care post op

## 2023-05-30 NOTE — PLAN OF CARE
Problem: Adult Inpatient Plan of Care  Goal: Plan of Care Review  Outcome: Ongoing, Progressing  Goal: Patient-Specific Goal (Individualized)  Outcome: Ongoing, Progressing  Goal: Absence of Hospital-Acquired Illness or Injury  Outcome: Ongoing, Progressing  Intervention: Identify and Manage Fall Risk  Recent Flowsheet Documentation  Taken 5/30/2023 1600 by Javier Stephen RN  Safety Promotion/Fall Prevention:   activity supervised   assistive device/personal items within reach   clutter free environment maintained   fall prevention program maintained   lighting adjusted   nonskid shoes/slippers when out of bed   room organization consistent   safety round/check completed  Taken 5/30/2023 1533 by Javier Stephen RN  Safety Promotion/Fall Prevention:   activity supervised   assistive device/personal items within reach   clutter free environment maintained   fall prevention program maintained   lighting adjusted   nonskid shoes/slippers when out of bed   room organization consistent   safety round/check completed  Intervention: Prevent Skin Injury  Recent Flowsheet Documentation  Taken 5/30/2023 1600 by Javier Stephen RN  Body Position: position changed independently  Skin Protection:   adhesive use limited   incontinence pads utilized   transparent dressing maintained   tubing/devices free from skin contact  Taken 5/30/2023 1533 by Javier Stephen RN  Body Position: position changed independently  Skin Protection:   adhesive use limited   incontinence pads utilized   transparent dressing maintained   tubing/devices free from skin contact  Intervention: Prevent and Manage VTE (Venous Thromboembolism) Risk  Recent Flowsheet Documentation  Taken 5/30/2023 1600 by Javier Stephen RN  Activity Management: activity encouraged  Taken 5/30/2023 1533 by Javier Stephen RN  Activity Management: activity encouraged  VTE Prevention/Management:   bilateral   sequential compression devices on  Range of Motion:  active ROM (range of motion) encouraged  Intervention: Prevent Infection  Recent Flowsheet Documentation  Taken 5/30/2023 1600 by Javier Stephen RN  Infection Prevention:   environmental surveillance performed   rest/sleep promoted  Taken 5/30/2023 1533 by Javier Stephen RN  Infection Prevention:   environmental surveillance performed   rest/sleep promoted  Goal: Optimal Comfort and Wellbeing  Outcome: Ongoing, Progressing  Intervention: Monitor Pain and Promote Comfort  Recent Flowsheet Documentation  Taken 5/30/2023 1600 by Javier Setphen RN  Pain Management Interventions: see MAR  Taken 5/30/2023 1533 by Javier Stephen RN  Pain Management Interventions: see MAR  Intervention: Provide Person-Centered Care  Recent Flowsheet Documentation  Taken 5/30/2023 1600 by Javier Stephen RN  Trust Relationship/Rapport:   care explained   choices provided  Taken 5/30/2023 1533 by Javier Stephen RN  Trust Relationship/Rapport:   care explained   choices provided  Goal: Readiness for Transition of Care  Outcome: Ongoing, Progressing  Intervention: Mutually Develop Transition Plan  Recent Flowsheet Documentation  Taken 5/30/2023 1549 by Javier Stephen RN  Transportation Anticipated: family or friend will provide  Patient/Family Anticipated Services at Transition: none  Patient/Family Anticipates Transition to: home     Problem: Fall Injury Risk  Goal: Absence of Fall and Fall-Related Injury  Outcome: Ongoing, Progressing  Intervention: Identify and Manage Contributors  Recent Flowsheet Documentation  Taken 5/30/2023 1600 by Javier Stephen RN  Medication Review/Management: medications reviewed  Taken 5/30/2023 1533 by Javier Stephen RN  Medication Review/Management:   medications reviewed   high-risk medications identified  Intervention: Promote Injury-Free Environment  Recent Flowsheet Documentation  Taken 5/30/2023 1600 by Javier Stephen RN  Safety Promotion/Fall Prevention:   activity  supervised   assistive device/personal items within reach   clutter free environment maintained   fall prevention program maintained   lighting adjusted   nonskid shoes/slippers when out of bed   room organization consistent   safety round/check completed  Taken 5/30/2023 1533 by Javier Stephen RN  Safety Promotion/Fall Prevention:   activity supervised   assistive device/personal items within reach   clutter free environment maintained   fall prevention program maintained   lighting adjusted   nonskid shoes/slippers when out of bed   room organization consistent   safety round/check completed     Problem: Diabetes Comorbidity  Goal: Blood Glucose Level Within Targeted Range  Outcome: Ongoing, Progressing     Problem: Obstructive Sleep Apnea Risk or Actual Comorbidity Management  Goal: Unobstructed Breathing During Sleep  Outcome: Ongoing, Progressing   Goal Outcome Evaluation:   Plan of care reviewed with: Patient

## 2023-05-30 NOTE — H&P
Pre-Op H&P  Luanne Forrester  [id number]  1970      Chief complaint: Rectal prolapse      Subjective:  Patient is a 53 y.o.female presents for scheduled surgery by Dr. Eric. She anticipates a RECTOPEXY WITH DAVINCI ROBOT today. She reports prolapsing anal tissue and leakage. Symptoms exacerbated with bowel movements. Last colonoscopy 12/15/22.      Review of Systems:  Constitutional-- No fever, chills or sweats. No fatigue.  CV-- No chest pain, palpitation or syncope. +HTN  Resp-- No SOB, cough, hemoptysis. +CHRISTY on cpap   Skin--No rashes or lesions      Allergies: No Known Allergies      Home Meds:  Medications Prior to Admission   Medication Sig Dispense Refill Last Dose   • albuterol sulfate  (90 Base) MCG/ACT inhaler Inhale 2 puffs Every 6 (Six) Hours As Needed for Wheezing.   Past Month   • Baclofen (LIORESAL) 5 MG tablet Take 1 tablet by mouth At Night As Needed (MUSCLE SPASMS).   Past Week   • cetirizine (zyrTEC) 10 MG tablet Take 1 tablet by mouth Daily.   Past Week   • cyanocobalamin 1000 MCG/ML injection Inject 1 mL into the appropriate muscle as directed by prescriber Every 28 (Twenty-Eight) Days.   Past Month   • famotidine (PEPCID) 40 MG tablet Take 1 tablet by mouth 2 (Two) Times a Day.   5/29/2023 at 1800   • fluticasone (FLONASE) 50 MCG/ACT nasal spray 2 sprays into the nostril(s) as directed by provider Daily As Needed for Rhinitis or Allergies.   Past Month   • losartan (COZAAR) 100 MG tablet Take 1 tablet by mouth Daily.   5/29/2023 at 1800   • meloxicam (MOBIC) 7.5 MG tablet Take 1 tablet by mouth Daily.   Past Week   • metroNIDAZOLE (FLAGYL) 500 MG tablet    5/29/2023 at 2300   • montelukast (SINGULAIR) 10 MG tablet Take 1 tablet by mouth Every Night.   5/29/2023 at 1800   • mupirocin (BACTROBAN) 2 % ointment    Past Week   • Myrbetriq 50 MG tablet sustained-release 24 hour 24 hr tablet Take 50 mg by mouth Daily.   5/29/2023 at 1000   • neomycin (MYCIFRADIN) 500 MG tablet     2023 at 2300   • spironolactone (ALDACTONE) 25 MG tablet Take 1 tablet by mouth Daily.   Past Month   • traMADol (ULTRAM) 50 MG tablet Take 1 tablet by mouth Every 8 (Eight) Hours As Needed for Moderate Pain or Severe Pain.   Past Month   • venlafaxine XR (EFFEXOR-XR) 150 MG 24 hr capsule Take 1 capsule by mouth Daily.   2023 at 1000         PMH:   Past Medical History:   Diagnosis Date   • Arthritis    • Bronchitis    • GERD (gastroesophageal reflux disease)    • Hypertension    • Migraine    • Pneumonia    • Prediabetes    • Sleep apnea     CPAP     PSH:    Past Surgical History:   Procedure Laterality Date   • COLONOSCOPY     • FOOT SURGERY Left 2019    Dr. Poole   • HYSTERECTOMY  2015    partial   • SPINE SURGERY  2021    Dr. Reeder   • TONSILLECTOMY     • TOTAL HIP ARTHROPLASTY Right 2022   • TOTAL HIP ARTHROPLASTY Left 2023    [facility]     Social History:   Tobacco:   Social History     Tobacco Use   Smoking Status Former   • Packs/day: 1.50   • Years: 30.00   • Pack years: 45.00   • Types: Cigarettes   • Quit date:    • Years since quittin.4   Smokeless Tobacco Never      Alcohol:     Social History     Substance and Sexual Activity   Alcohol Use Never         Physical Exam: VS: /83  HR 69  RR 16  T 96.9  Sat 95%RA      General Appearance:    Alert, cooperative, no distress, appears stated age   Head:    Normocephalic, without obvious abnormality, atraumatic   Lungs:     Clear to auscultation bilaterally, respirations unlabored    Heart:   Regular rate and rhythm, S1 and S2 normal    Abdomen:    Soft without tenderness   Extremities:   Extremities normal, atraumatic, no cyanosis or edema   Skin:   Skin color, texture, turgor normal, no rashes or lesions   Neurologic:   Grossly intact     Results Review:     LABS:  Lab Results   Component Value Date    WBC 11.89 (H) 2023    HGB 12.4 2023    HCT 39.5 2023    MCV 88.0 2023     2023     NEUTROABS 11.44 (H) 08/18/2022    GLUCOSE 105 (H) 05/23/2023    BUN 11 05/23/2023    CREATININE 0.66 05/23/2023     05/23/2023    K 4.5 05/23/2023     05/23/2023    CO2 25.0 05/23/2023    CALCIUM 9.9 05/23/2023    ALBUMIN 4.0 05/23/2023    AST 15 05/23/2023    ALT 18 05/23/2023    BILITOT <0.2 05/23/2023       RADIOLOGY:  Imaging Results (Last 72 Hours)     ** No results found for the last 72 hours. **          I reviewed the patient's new clinical results.    Cancer Staging (if applicable)  Cancer Patient: __ yes __no __unknown; If yes, clinical stage T:__ N:__M:__, stage group or __N/A      Impression: Rectal prolapse       Plan: RECTOPEXY WITH DAVINCI ROBOT      GRANT Shah   5/30/2023   10:49 EDT

## 2023-05-30 NOTE — H&P
[facility]   HISTORY AND PHYSICAL    Patient Name: Luanne Forrester  : 1970  MRN: [id number]  Primary Care Physician:  Sandee Valadez APRN  Date of admission: 2023    Subjective   Subjective     Chief Complaint: Rectal prolapse    History of Present Illness     53-year-old female with a history of rectal prolapse, she was seen few months ago in my clinic, she had circumferential full-thickness prolapse.  She does not suffer from constipation.  Ultimately, we decided upon surgical correction, she did recently have a left hip surgery and wanted to wait till after this was complete.  She voiced understand the risk benefits associated procedure and wished to proceed    Review of Systems   All other systems reviewed and are negative.       Personal History     Past Medical History:   Diagnosis Date   • Arthritis    • Bronchitis    • GERD (gastroesophageal reflux disease)    • Hypertension    • Migraine    • Pneumonia    • Prediabetes    • Sleep apnea     CPAP       Past Surgical History:   Procedure Laterality Date   • COLONOSCOPY     • FOOT SURGERY Left 2019    Dr. Poole   • HYSTERECTOMY  2015    partial   • SPINE SURGERY  2021    Dr. Reeder   • TONSILLECTOMY     • TOTAL HIP ARTHROPLASTY Right 2022   • TOTAL HIP ARTHROPLASTY Left 2023    [facility]       Family History: family history includes Cancer in her father; Diabetes in her father; Gout in her father and mother; Heart disease in her father and mother; Hypertension in her father. Otherwise pertinent FHx was reviewed and not pertinent to current issue.    Social History:  reports that she quit smoking about 5 years ago. Her smoking use included cigarettes. She has a 45.00 pack-year smoking history. She has never used smokeless tobacco. She reports that she does not drink alcohol and does not use drugs.    Home Medications:  Baclofen, Mirabegron ER, albuterol sulfate HFA, cetirizine, cyanocobalamin, famotidine,  fluticasone, losartan, meloxicam, metroNIDAZOLE, montelukast, mupirocin, neomycin, spironolactone, traMADol, and venlafaxine XR    Allergies:  No Known Allergies    Objective    Objective     Vitals:        Physical Exam  Vitals reviewed.   HENT:      Head: Normocephalic.      Nose: Nose normal.      Mouth/Throat:      Mouth: Mucous membranes are moist.   Eyes:      Pupils: Pupils are equal, round, and reactive to light.   Cardiovascular:      Rate and Rhythm: Normal rate.      Pulses: Normal pulses.   Pulmonary:      Effort: Pulmonary effort is normal.   Abdominal:      General: Abdomen is flat.   Genitourinary:     Comments: Deferred  Musculoskeletal:         General: Normal range of motion.      Cervical back: Normal range of motion.   Skin:     General: Skin is warm.      Capillary Refill: Capillary refill takes less than 2 seconds.   Neurological:      General: No focal deficit present.      Mental Status: She is alert.   Psychiatric:         Mood and Affect: Mood normal.         Result Review    Result Review:  I have personally reviewed the results from the time of this admission to 5/30/2023 10:49 EDT and agree with these findings:  [x]  Laboratory list / accordion  []  Microbiology  []  Radiology  []  EKG/Telemetry   []  Cardiology/Vascular   []  Pathology  []  Old records  []  Other:  Most notable findings include: Hemoglobin A1c of 6.1.      Assessment & Plan   Assessment / Plan     Brief Patient Summary:  Luanne Forrester is a 53 y.o. female with a history of rectal prolapse who presents today for an elective robotic assisted laparoscopic rectopexy.    I discussed with her multiple different options to fix her rectal prolapse including transabdominal approaches including mesh versus perineal approaches.  I have discussed that since she is not constipated and she is quite young, I think a suture rectopexy is her best bet.  I do not think that mesh placement is appropriate for her with the risk of mesh  erosion especially in her young age.    Active Hospital Problems:  There are no active hospital problems to display for this patient.    Plan: Plan for robotic suture rectopexy, ERA S    DVT prophylaxis:  Medical and mechanical DVT prophylaxis orders are present.    CODE STATUS:       Admission Status:  I believe this patient meets inpatient status.    Yonatan Eric MD

## 2023-05-30 NOTE — ANESTHESIA PREPROCEDURE EVALUATION
Anesthesia Evaluation     Patient summary reviewed and Nursing notes reviewed                Airway   Mallampati: II  TM distance: >3 FB  Neck ROM: full  No difficulty expected  Dental - normal exam   (+) lower dentures and upper dentures    Pulmonary - normal exam   (+) pneumonia , sleep apnea,   Cardiovascular - normal exam    (+) hypertension,       Neuro/Psych  (+) headaches,    GI/Hepatic/Renal/Endo    (+)  GERD,      Musculoskeletal     Abdominal  - normal exam    Bowel sounds: normal.   Substance History - negative use     OB/GYN negative ob/gyn ROS         Other   arthritis,                    Anesthesia Plan    ASA 3     general     intravenous induction     Anesthetic plan, risks, benefits, and alternatives have been provided, discussed and informed consent has been obtained with: patient.    Plan discussed with CRNA.        CODE STATUS:

## 2023-05-30 NOTE — OP NOTE
[facility]  OPERATIVE REPORT      Luanne Forrester  5/30/2023    Pre-op Diagnosis:   Rectal prolapse      Post-op Diagnosis:    Post-Op Diagnosis Codes:  Same      Procedure(s):  RECTOPEXY WITH DAVINCI ROBOT    Surgeon(s):  Yonatan Eric MD    Anesthesia: General    Staff:   Circulator: Rita Mathews RN; Vilma Nelson RN; Danielle Gordon RN  Scrub Person: Diane Clark; Cornelio Burnham; Lee Ann Ling RN  Nursing Assistant: Claudia Kearney PA-C was responsible for performing the following activities: Retraction, Suction, Irrigation, Suturing and Closing and their skilled assistance was necessary for the success of this case.       Estimated Blood Loss: 25 mL    Urine Voided: 150 mL    Specimens:                None          Drains:   Urethral Catheter 16 Fr. (Active)       Indications:    Findings: Successful suture rectopexy performed robotically    Complications: None    Procedure in Detail: After informed written consent was obtained, the patient was brought to the operating theater, timeout was performed all parties in agreement.  General anesthesia was introduced, antibiotics were administered, she underwent a preoperative tap block performed by anesthesia, Downing catheter was placed, she was placed in the lithotomy position with all pressure points padded.  The abdomen was then prepped and draped in the usual sterile fashion.    I first started the procedure by obtaining pneumoperitoneum at Barry's point.  This was performed atraumatically.  3 additional robotic trochars were then placed in a diagonal line from the left subcostal space to the right ASIS.  An additional assistant port was placed in the right hemiabdomen.  The patient was then placed in the Trendelenburg position with the right side down.  The robot was then docked and I resumed dissection at the bedside console.    The sigmoid colon was then elevated and I incised where the  mesentery met the retroperitoneum.  Retroperitoneal structures were then swept down, the ureter was identified and protected throughout this part.  This dissection was carried over the sacral promontory down to the pelvic floor just outside the fascia propria of the mesorectum confirmed by palpation transanally.  I then turned my attention to the right side, I did release a little bit of this lateral stalk in order to help gain access to the rectovaginal septum anteriorly.  She was status post hysterectomy in these planes were quite fused, EEA sizer was also placed in the vagina in order to help elucidate this plane.  Ultimately, this was taken down with the use of scissors to the perineal body.  At this point, I felt I had mobilized enough posteriorly as well as anteriorly.  There was minimal tension on the right lateral stalk.   I then retracted the rectum until adequate tension, I cleared off the sacral promontory of nerves and vessels.  Identified an appropriate spot on the promontory, 2-0 silk was then used passed through the anterior spinal ligaments and periosteum of the bone and through the mesorectum in multiple places, taking close bites next to the proximal rectal wall.  This was performed for a total of 4 separate silk sutures which seem to hold the rectum nicely.  I then asked my anesthesia colleagues to perform Valsalva in order to assess for prolapse, there were no signs of recurrent prolapse.  I then closed the peritoneum in a running fashion utilizing 3-0 strata fix burrowing the suture by imbricating the peritoneum.  There were no signs of any bleeding hemostasis was verified.    At this point, the robot was undocked and pneumoperitoneum was aborted.  All skin incisions were then closed with Monocryl. Patient tolerated the procedure well was subsequently extubated and transferred to the recovery room.             Yonatan Eric MD     Date: 5/30/2023  Time: 13:36 EDT

## 2023-05-31 PROBLEM — G89.18 ACUTE POSTOPERATIVE PAIN: Status: ACTIVE | Noted: 2023-05-31

## 2023-05-31 LAB
ANION GAP SERPL CALCULATED.3IONS-SCNC: 11 MMOL/L (ref 5–15)
BUN SERPL-MCNC: 7 MG/DL (ref 6–20)
BUN/CREAT SERPL: 13.7 (ref 7–25)
CALCIUM SPEC-SCNC: 9 MG/DL (ref 8.6–10.5)
CHLORIDE SERPL-SCNC: 105 MMOL/L (ref 98–107)
CO2 SERPL-SCNC: 25 MMOL/L (ref 22–29)
CREAT SERPL-MCNC: 0.51 MG/DL (ref 0.57–1)
EGFRCR SERPLBLD CKD-EPI 2021: 113.2 ML/MIN/1.73
GLUCOSE BLDC GLUCOMTR-MCNC: 139 MG/DL (ref 70–130)
GLUCOSE BLDC GLUCOMTR-MCNC: 150 MG/DL (ref 70–130)
GLUCOSE BLDC GLUCOMTR-MCNC: 155 MG/DL (ref 70–130)
GLUCOSE BLDC GLUCOMTR-MCNC: 181 MG/DL (ref 70–130)
GLUCOSE SERPL-MCNC: 218 MG/DL (ref 65–99)
HCT VFR BLD AUTO: 36.3 % (ref 34–46.6)
HGB BLD-MCNC: 11.5 G/DL (ref 12–15.9)
MAGNESIUM SERPL-MCNC: 2.4 MG/DL (ref 1.6–2.6)
POTASSIUM SERPL-SCNC: 4.9 MMOL/L (ref 3.5–5.2)
SODIUM SERPL-SCNC: 141 MMOL/L (ref 136–145)

## 2023-05-31 PROCEDURE — 85018 HEMOGLOBIN: CPT | Performed by: STUDENT IN AN ORGANIZED HEALTH CARE EDUCATION/TRAINING PROGRAM

## 2023-05-31 PROCEDURE — 83735 ASSAY OF MAGNESIUM: CPT | Performed by: STUDENT IN AN ORGANIZED HEALTH CARE EDUCATION/TRAINING PROGRAM

## 2023-05-31 PROCEDURE — 63710000001 INSULIN LISPRO (HUMAN) PER 5 UNITS

## 2023-05-31 PROCEDURE — 85014 HEMATOCRIT: CPT | Performed by: STUDENT IN AN ORGANIZED HEALTH CARE EDUCATION/TRAINING PROGRAM

## 2023-05-31 PROCEDURE — 82948 REAGENT STRIP/BLOOD GLUCOSE: CPT

## 2023-05-31 PROCEDURE — 25010000002 KETOROLAC TROMETHAMINE PER 15 MG: Performed by: STUDENT IN AN ORGANIZED HEALTH CARE EDUCATION/TRAINING PROGRAM

## 2023-05-31 PROCEDURE — 25010000002 ENOXAPARIN PER 10 MG: Performed by: STUDENT IN AN ORGANIZED HEALTH CARE EDUCATION/TRAINING PROGRAM

## 2023-05-31 PROCEDURE — 80048 BASIC METABOLIC PNL TOTAL CA: CPT | Performed by: STUDENT IN AN ORGANIZED HEALTH CARE EDUCATION/TRAINING PROGRAM

## 2023-05-31 RX ORDER — TRAMADOL HYDROCHLORIDE 50 MG/1
50 TABLET ORAL EVERY 6 HOURS PRN
Qty: 28 TABLET | Refills: 0 | Status: SHIPPED | OUTPATIENT
Start: 2023-05-31 | End: 2023-06-08

## 2023-05-31 RX ORDER — BACLOFEN 10 MG/1
5 TABLET ORAL EVERY 8 HOURS SCHEDULED
Qty: 11 TABLET | Refills: 0 | Status: SHIPPED | OUTPATIENT
Start: 2023-05-31 | End: 2023-06-09

## 2023-05-31 RX ORDER — POLYETHYLENE GLYCOL 3350 17 G/17G
17 POWDER, FOR SOLUTION ORAL DAILY
Qty: 238 G | Refills: 0 | Status: SHIPPED | OUTPATIENT
Start: 2023-06-01 | End: 2023-06-15

## 2023-05-31 RX ADMIN — ACETAMINOPHEN 1000 MG: 500 TABLET ORAL at 20:15

## 2023-05-31 RX ADMIN — INSULIN LISPRO 2 UNITS: 100 INJECTION, SOLUTION INTRAVENOUS; SUBCUTANEOUS at 11:29

## 2023-05-31 RX ADMIN — INSULIN LISPRO 2 UNITS: 100 INJECTION, SOLUTION INTRAVENOUS; SUBCUTANEOUS at 17:04

## 2023-05-31 RX ADMIN — KETOROLAC TROMETHAMINE 30 MG: 30 INJECTION, SOLUTION INTRAMUSCULAR; INTRAVENOUS at 11:28

## 2023-05-31 RX ADMIN — DIAZEPAM 5 MG: 5 TABLET ORAL at 13:14

## 2023-05-31 RX ADMIN — POTASSIUM CHLORIDE, DEXTROSE MONOHYDRATE AND SODIUM CHLORIDE 75 ML/HR: 150; 5; 450 INJECTION, SOLUTION INTRAVENOUS at 04:36

## 2023-05-31 RX ADMIN — VENLAFAXINE HYDROCHLORIDE 150 MG: 75 CAPSULE, EXTENDED RELEASE ORAL at 08:54

## 2023-05-31 RX ADMIN — ACETAMINOPHEN 1000 MG: 500 TABLET ORAL at 04:36

## 2023-05-31 RX ADMIN — BACLOFEN 5 MG: 10 TABLET ORAL at 20:16

## 2023-05-31 RX ADMIN — FAMOTIDINE 40 MG: 20 TABLET ORAL at 20:16

## 2023-05-31 RX ADMIN — FAMOTIDINE 40 MG: 20 TABLET ORAL at 08:54

## 2023-05-31 RX ADMIN — LOSARTAN POTASSIUM 100 MG: 50 TABLET, FILM COATED ORAL at 17:04

## 2023-05-31 RX ADMIN — ALVIMOPAN 12 MG: 12 CAPSULE ORAL at 08:54

## 2023-05-31 RX ADMIN — KETOROLAC TROMETHAMINE 30 MG: 30 INJECTION, SOLUTION INTRAMUSCULAR; INTRAVENOUS at 00:02

## 2023-05-31 RX ADMIN — KETOROLAC TROMETHAMINE 30 MG: 30 INJECTION, SOLUTION INTRAMUSCULAR; INTRAVENOUS at 06:28

## 2023-05-31 RX ADMIN — MONTELUKAST 10 MG: 10 TABLET, FILM COATED ORAL at 20:17

## 2023-05-31 RX ADMIN — INSULIN LISPRO 2 UNITS: 100 INJECTION, SOLUTION INTRAVENOUS; SUBCUTANEOUS at 20:14

## 2023-05-31 RX ADMIN — KETOROLAC TROMETHAMINE 30 MG: 30 INJECTION, SOLUTION INTRAMUSCULAR; INTRAVENOUS at 17:04

## 2023-05-31 RX ADMIN — SPIRONOLACTONE 25 MG: 25 TABLET ORAL at 08:54

## 2023-05-31 RX ADMIN — BACLOFEN 5 MG: 10 TABLET ORAL at 06:28

## 2023-05-31 RX ADMIN — CETIRIZINE HYDROCHLORIDE 10 MG: 10 TABLET, FILM COATED ORAL at 08:54

## 2023-05-31 RX ADMIN — POLYETHYLENE GLYCOL 3350 17 G: 17 POWDER, FOR SOLUTION ORAL at 08:54

## 2023-05-31 RX ADMIN — BACLOFEN 5 MG: 10 TABLET ORAL at 13:12

## 2023-05-31 RX ADMIN — ENOXAPARIN SODIUM 40 MG: 100 INJECTION SUBCUTANEOUS at 08:54

## 2023-05-31 NOTE — CASE MANAGEMENT/SOCIAL WORK
Discharge Planning Assessment  [facility]     Patient Name: Luanne Forrester  MRN: [id number]  Today's Date: 5/31/2023    Admit Date: 5/30/2023    Plan: Home   Discharge Needs Assessment     Row Name 05/31/23 1558       Living Environment    People in Home significant other    Name(s) of People in Home Hector    Current Living Arrangements home    Potentially Unsafe Housing Conditions none    Primary Care Provided by self    Provides Primary Care For no one    Family Caregiver if Needed child(edwige), adult    Family Caregiver Names JERILYN,BETO (Daughter)   [phone] (Home Phone)    Quality of Family Relationships helpful;involved    Able to Return to Prior Arrangements yes       Resource/Environmental Concerns    Resource/Environmental Concerns none    Transportation Concerns none       Food Insecurity    Within the past 12 months, you worried that your food would run out before you got the money to buy more. Never true    Within the past 12 months, the food you bought just didn't last and you didn't have money to get more. Never true       Transition Planning    Patient/Family Anticipates Transition to home    Patient/Family Anticipated Services at Transition none    Transportation Anticipated family or friend will provide       Discharge Needs Assessment    Readmission Within the Last 30 Days no previous admission in last 30 days    Equipment Currently Used at Home cpap    Concerns to be Addressed denies needs/concerns at this time    Anticipated Changes Related to Illness none               Discharge Plan     Row Name 05/31/23 1559       Plan    Plan Home    Patient/Family in Agreement with Plan yes    Plan Comments I spoke with the patient at the bedside. She lives in [place] in a house with her significant other. She is independent with ADLs. She has a PCP and insurance. The only medical equipment she uses is a CPAP machine from [facility]. She plans to return home at discharge. Her daughter will be  transporting her home. No needs voiced or identified. CM following.    Final Discharge Disposition Code 01 - home or self-care              Continued Care and Services - Admitted Since 5/30/2023    Coordination has not been started for this encounter.          Demographic Summary    No documentation.                Functional Status     Row Name 05/31/23 1558       Functional Status    Usual Activity Tolerance good    Current Activity Tolerance good       Physical Activity    On average, how many days per week do you engage in moderate to strenuous exercise (like a brisk walk)? 0 days    On average, how many minutes do you engage in exercise at this level? 0 min    Number of minutes of exercise per week 0       Assessment of Health Literacy    How often do you have someone help you read hospital materials? Never    How often do you have problems learning about your medical condition because of difficulty understanding written information? Never    How often do you have a problem understanding what is told to you about your medical condition? Never    How confident are you filling out medical forms by yourself? Extremely    Health Literacy Excellent       Functional Status, IADL    Medications independent    Meal Preparation independent    Housekeeping independent    Laundry independent    Shopping independent       Mental Status    General Appearance WDL WDL               Psychosocial    No documentation.                Abuse/Neglect    No documentation.                Legal    No documentation.                Substance Abuse    No documentation.                Patient Forms    No documentation.                   Maira Kay RN

## 2023-05-31 NOTE — PROGRESS NOTES
"IM progress note      Luanne Forrester  [id number]  1970     LOS: 1 day     Attending: Yonatan Eric MD    Primary Care Provider: Sandee Valadez APRN      Chief Complaint/Reason for visit:  Abd pain    Subjective   Doing well. Good pain control. Tolerating diet. Ambulating. Passed small amount flatus. Denies f/c/n/v/sob/cp.    Objective     Vital Signs    Visit Vitals  BP 99/64 (BP Location: Left arm, Patient Position: Lying)   Pulse 80   Temp 98 °F (36.7 °C) (Oral)   Resp 16   Ht 168.9 cm (66.5\")   Wt 105 kg (232 lb 9.4 oz)   SpO2 95%   BMI 36.98 kg/m²     Temp (24hrs), Av.6 °F (36.4 °C), Min:97 °F (36.1 °C), Max:98.6 °F (37 °C)      Nutrition: Regular diet    Respiratory: RA    Physical Exam:     General Appearance:    Alert, cooperative, in no acute distress   Head:    Normocephalic, without obvious abnormality, atraumatic    Lungs:     Normal effort, symmetric chest rise, no crepitus, clear to      auscultation bilaterally             Heart:    Regular rhythm and normal rate, normal S1 and S2   Abdomen:     Soft, expected tenderness. Clean incisions   Extremities:   No clubbing, cyanosis or edema.  No deformities.    Pulses:   Pulses palpable and equal bilaterally   Skin:   No bleeding, bruising or rash   Neurologic:   Moves all extremities with no obvious focal motor deficit.  Cranial nerves 2 - 12 grossly intact     Results Review:     I reviewed the patient's new clinical results.   Results from last 7 days   Lab Units 23  0348   HEMOGLOBIN g/dL 11.5*   HEMATOCRIT % 36.3     Results from last 7 days   Lab Units 23  0348   SODIUM mmol/L 141   POTASSIUM mmol/L 4.9   CHLORIDE mmol/L 105   CO2 mmol/L 25.0   BUN mg/dL 7   CREATININE mg/dL 0.51*   CALCIUM mg/dL 9.0   GLUCOSE mg/dL 218*     I reviewed the patient's new imaging including images and reports.    All medications reviewed.   acetaminophen, 1,000 mg, Oral, Q6H  alvimopan, 12 mg, Oral, BID  Baclofen, 5 mg, Oral, " Q8H  cetirizine, 10 mg, Oral, Daily  enoxaparin, 40 mg, Subcutaneous, Daily  famotidine, 40 mg, Oral, BID  insulin lispro, 2-7 Units, Subcutaneous, 4x Daily AC & at Bedtime  ketorolac, 30 mg, Intravenous, Q6H  losartan, 100 mg, Oral, Daily With Dinner  Mirabegron ER, 50 mg, Oral, Daily  montelukast, 10 mg, Oral, Nightly  polyethylene glycol, 17 g, Oral, Daily  spironolactone, 25 mg, Oral, Daily  venlafaxine XR, 150 mg, Oral, Daily        Assessment & Plan     s/p rectopexy    Rectal prolapse    HTN (hypertension)    GERD (gastroesophageal reflux disease)    Sleep apnea    Prediabetes    Acute postoperative pain      Plan  1. Ambulation  2. Pain control-prns   3. IS-encouraged  4. DVT proph- mechs/Lovenox  5. Bowel regimen/entereg  6. Soft diet  7. Monitor post-op labs  8. DC planning for home     HTN  - Continue home losartan, Aldactone  - Monitor BP   - Holding parameters for BP meds  - Labetalol PRN for SBP>170     GERD  -Pepcid     Sleep apnea  -Monitor O2 sats     PreDM  - hgb A1c on 5/23/2023 6.1  - Accu-Chek AC and HS with low dose SSI      GRANT Shah  05/31/23  11:38 EDT

## 2023-05-31 NOTE — PROGRESS NOTES
"[facility]    1 Day Post-Op   Length of stay: 1 days    Subjective: She is doing well today.  Pain is fairly well controlled.  Denies any fevers or chills.  No flatus yet.  Tolerated diet.    Physical Exam:  Blood pressure 103/66, pulse 60, temperature 97.8 °F (36.6 °C), temperature source Oral, resp. rate 18, height 168.9 cm (66.5\"), weight 105 kg (232 lb 9.4 oz), SpO2 94 %.    Abd: Soft, appropriately tender, incisions are clean dry and intact    Labs past 24 hours:  Lab Results (last 24 hours)     Procedure Component Value Units Date/Time    POC Glucose Once [[id number]]  (Abnormal) Collected: 05/31/23 0755    Specimen: Blood Updated: 05/31/23 0756     Glucose 139 mg/dL      Comment: Meter: [id number] : [id number] Mora Modi       Magnesium [[id number]]  (Normal) Collected: 05/31/23 0348    Specimen: Blood Updated: 05/31/23 0449     Magnesium 2.4 mg/dL     Basic Metabolic Panel [[id number]]  (Abnormal) Collected: 05/31/23 0348    Specimen: Blood Updated: 05/31/23 0449     Glucose 218 mg/dL      BUN 7 mg/dL      Creatinine 0.51 mg/dL      Sodium 141 mmol/L      Potassium 4.9 mmol/L      Chloride 105 mmol/L      CO2 25.0 mmol/L      Calcium 9.0 mg/dL      BUN/Creatinine Ratio 13.7     Anion Gap 11.0 mmol/L      eGFR 113.2 mL/min/1.73     Narrative:      GFR Normal >60  Chronic Kidney Disease <60  Kidney Failure <15      Hemoglobin & Hematocrit, Blood [[id number]]  (Abnormal) Collected: 05/31/23 0348    Specimen: Blood Updated: 05/31/23 0429     Hemoglobin 11.5 g/dL      Hematocrit 36.3 %     POC Glucose Once [[id number]]  (Abnormal) Collected: 05/30/23 1958    Specimen: Blood Updated: 05/30/23 2001     Glucose 249 mg/dL      Comment: Meter: [id number] : [id number] Marta Gagnon       POC Glucose Once [[id number]]  (Abnormal) Collected: 05/30/23 1724    Specimen: Blood Updated: 05/30/23 1726     Glucose 212 mg/dL      Comment: Meter: [id number] : [id number] " Mateo Hankins             I/O last shift:  No intake/output data recorded.       Pathology:  Order Name Source Comment Collection Info Order Time   BASIC METABOLIC PANEL   Collected By: Kathleen Goel 5/30/2023 10:04 PM   MAGNESIUM   Collected By: Kathleen Goel 5/30/2023 10:04 PM     Release to patient   Routine Release        HEMOGLOBIN AND HEMATOCRIT, BLOOD   Collected By: Kathleen Goel 5/30/2023 10:04 PM   .    Assessment and Plan:  53-year-old female who is postop day 1 from a robotic rectopexy, still awaiting return of bowel function.    Plan  Continue multimodal pain control  Incentive spirometry  Vitals every 4  Full liquid diet, advance to GI soft as tolerated  Discontinue IV fluids and Downing catheter today  SCDs, Lovenox start today    Yonatan Eric MD  [facility]  05/31/23  08:14 EDT

## 2023-05-31 NOTE — PLAN OF CARE
Problem: Adult Inpatient Plan of Care  Goal: Plan of Care Review  Outcome: Ongoing, Progressing  Goal: Patient-Specific Goal (Individualized)  Outcome: Ongoing, Progressing  Goal: Absence of Hospital-Acquired Illness or Injury  Outcome: Ongoing, Progressing  Intervention: Identify and Manage Fall Risk  Recent Flowsheet Documentation  Taken 5/31/2023 0436 by Свелтана Miller RN  Safety Promotion/Fall Prevention:   activity supervised   assistive device/personal items within reach   clutter free environment maintained   fall prevention program maintained   safety round/check completed   room organization consistent  Taken 5/31/2023 0217 by Светлана Miller RN  Safety Promotion/Fall Prevention:   activity supervised   assistive device/personal items within reach   clutter free environment maintained   fall prevention program maintained   nonskid shoes/slippers when out of bed   room organization consistent   safety round/check completed  Taken 5/31/2023 0002 by Светлана Miller RN  Safety Promotion/Fall Prevention:   activity supervised   clutter free environment maintained   assistive device/personal items within reach   safety round/check completed   nonskid shoes/slippers when out of bed  Taken 5/30/2023 2156 by Светлана Miller RN  Safety Promotion/Fall Prevention:   activity supervised   clutter free environment maintained   assistive device/personal items within reach   safety round/check completed   room organization consistent  Taken 5/30/2023 1942 by Светлана Miller RN  Safety Promotion/Fall Prevention:   activity supervised   assistive device/personal items within reach   clutter free environment maintained   fall prevention program maintained   nonskid shoes/slippers when out of bed   safety round/check completed   room organization consistent  Intervention: Prevent Skin Injury  Recent Flowsheet Documentation  Taken 5/31/2023 0436 by Светлана Miller RN  Skin Protection:   adhesive use  limited   incontinence pads utilized  Taken 5/31/2023 0217 by Светлана Miller RN  Skin Protection: adhesive use limited  Taken 5/31/2023 0002 by Светлана Miller RN  Skin Protection: adhesive use limited  Taken 5/30/2023 2156 by Светлана Miller RN  Skin Protection: adhesive use limited  Taken 5/30/2023 1942 by Светлана Miller RN  Body Position: position changed independently  Skin Protection:   adhesive use limited   incontinence pads utilized  Intervention: Prevent and Manage VTE (Venous Thromboembolism) Risk  Recent Flowsheet Documentation  Taken 5/31/2023 0436 by Светлана Miller RN  Activity Management: up in chair  Taken 5/31/2023 0217 by Светлана Miller RN  Activity Management: activity encouraged  Taken 5/31/2023 0002 by Светлана Miller RN  Activity Management: activity encouraged  Taken 5/30/2023 2156 by Светлана Miller RN  Activity Management: activity encouraged  Taken 5/30/2023 1942 by Светлана Miller RN  Activity Management: activity encouraged  VTE Prevention/Management:   bilateral   sequential compression devices on  Intervention: Prevent Infection  Recent Flowsheet Documentation  Taken 5/31/2023 0436 by Светлана Miller RN  Infection Prevention:   single patient room provided   rest/sleep promoted  Taken 5/31/2023 0217 by Светлана Miller RN  Infection Prevention:   single patient room provided   rest/sleep promoted  Taken 5/31/2023 0002 by Светлана Miller RN  Infection Prevention:   single patient room provided   rest/sleep promoted  Taken 5/30/2023 2156 by Светлана Miller RN  Infection Prevention:   rest/sleep promoted   single patient room provided  Taken 5/30/2023 1942 by Светлана Miller RN  Infection Prevention:   single patient room provided   rest/sleep promoted  Goal: Optimal Comfort and Wellbeing  Outcome: Ongoing, Progressing  Intervention: Monitor Pain and Promote Comfort  Recent Flowsheet Documentation  Taken 5/31/2023 0436 by Светлана Miller  RN  Pain Management Interventions: see MAR  Taken 5/30/2023 2156 by Светлана Miller RN  Pain Management Interventions: see MAR  Intervention: Provide Person-Centered Care  Recent Flowsheet Documentation  Taken 5/30/2023 1942 by Светлана Miller RN  Trust Relationship/Rapport:   care explained   choices provided   empathic listening provided   reassurance provided   thoughts/feelings acknowledged   questions encouraged   questions answered   emotional support provided  Goal: Readiness for Transition of Care  Outcome: Ongoing, Progressing     Problem: Fall Injury Risk  Goal: Absence of Fall and Fall-Related Injury  Outcome: Ongoing, Progressing  Intervention: Identify and Manage Contributors  Recent Flowsheet Documentation  Taken 5/31/2023 0002 by Светлана Miller RN  Medication Review/Management: medications reviewed  Taken 5/30/2023 2156 by Светлана Miller RN  Medication Review/Management: medications reviewed  Taken 5/30/2023 1942 by Светлана Miller RN  Medication Review/Management: medications reviewed  Intervention: Promote Injury-Free Environment  Recent Flowsheet Documentation  Taken 5/31/2023 0436 by Светлана Miller RN  Safety Promotion/Fall Prevention:   activity supervised   assistive device/personal items within reach   clutter free environment maintained   fall prevention program maintained   safety round/check completed   room organization consistent  Taken 5/31/2023 0217 by Светлана Miller RN  Safety Promotion/Fall Prevention:   activity supervised   assistive device/personal items within reach   clutter free environment maintained   fall prevention program maintained   nonskid shoes/slippers when out of bed   room organization consistent   safety round/check completed  Taken 5/31/2023 0002 by Светлана Miller RN  Safety Promotion/Fall Prevention:   activity supervised   clutter free environment maintained   assistive device/personal items within reach   safety round/check  completed   nonskid shoes/slippers when out of bed  Taken 5/30/2023 2156 by Светлана Miller, RN  Safety Promotion/Fall Prevention:   activity supervised   clutter free environment maintained   assistive device/personal items within reach   safety round/check completed   room organization consistent  Taken 5/30/2023 1942 by Светлана Miller, RN  Safety Promotion/Fall Prevention:   activity supervised   assistive device/personal items within reach   clutter free environment maintained   fall prevention program maintained   nonskid shoes/slippers when out of bed   safety round/check completed   room organization consistent     Problem: Diabetes Comorbidity  Goal: Blood Glucose Level Within Targeted Range  Outcome: Ongoing, Progressing     Problem: Obstructive Sleep Apnea Risk or Actual Comorbidity Management  Goal: Unobstructed Breathing During Sleep  Outcome: Ongoing, Progressing   Goal Outcome Evaluation:

## 2023-05-31 NOTE — PLAN OF CARE
Problem: Adult Inpatient Plan of Care  Goal: Plan of Care Review  Outcome: Ongoing, Progressing  Flowsheets (Taken 5/31/2023 1648)  Progress: improving  Plan of Care Reviewed With: patient  Goal: Patient-Specific Goal (Individualized)  Outcome: Ongoing, Progressing  Goal: Absence of Hospital-Acquired Illness or Injury  Outcome: Ongoing, Progressing  Intervention: Identify and Manage Fall Risk  Recent Flowsheet Documentation  Taken 5/31/2023 1600 by Ariane Epps RN  Safety Promotion/Fall Prevention:   activity supervised   assistive device/personal items within reach   clutter free environment maintained   room organization consistent   safety round/check completed   toileting scheduled  Taken 5/31/2023 1400 by Ariane Epps RN  Safety Promotion/Fall Prevention:   activity supervised   assistive device/personal items within reach   clutter free environment maintained   toileting scheduled   safety round/check completed   room organization consistent  Taken 5/31/2023 1200 by Ariane Epps RN  Safety Promotion/Fall Prevention:   activity supervised   assistive device/personal items within reach   clutter free environment maintained   toileting scheduled   safety round/check completed   room organization consistent  Taken 5/31/2023 1000 by Ariane Epps RN  Safety Promotion/Fall Prevention:   activity supervised   assistive device/personal items within reach   clutter free environment maintained   toileting scheduled   safety round/check completed   room organization consistent  Taken 5/31/2023 0800 by Ariane Epps RN  Safety Promotion/Fall Prevention:   activity supervised   assistive device/personal items within reach   clutter free environment maintained   toileting scheduled   safety round/check completed   room organization consistent  Intervention: Prevent Skin Injury  Recent Flowsheet Documentation  Taken 5/31/2023 1600 by Ariane Epps RN  Body Position:   position changed  independently   supine, legs elevated  Skin Protection:   adhesive use limited   tubing/devices free from skin contact   transparent dressing maintained   skin-to-device areas padded   skin-to-skin areas padded  Taken 5/31/2023 1400 by Ariane Epps RN  Body Position:   position changed independently   supine, legs elevated  Skin Protection:   adhesive use limited   tubing/devices free from skin contact   transparent dressing maintained  Taken 5/31/2023 1200 by Ariane Epps RN  Body Position:   position changed independently   supine, legs elevated   tilted   left  Skin Protection:   adhesive use limited   transparent dressing maintained   tubing/devices free from skin contact  Taken 5/31/2023 1000 by Ariane Epps RN  Body Position:   position changed independently   sitting up in bed  Skin Protection:   adhesive use limited   tubing/devices free from skin contact   transparent dressing maintained  Taken 5/31/2023 0800 by Ariane Epps RN  Body Position:   position changed independently   sitting up in bed  Skin Protection:   adhesive use limited   tubing/devices free from skin contact   transparent dressing maintained  Intervention: Prevent and Manage VTE (Venous Thromboembolism) Risk  Recent Flowsheet Documentation  Taken 5/31/2023 1600 by Ariane Epps RN  Activity Management:   up ad mikel   ambulated to bathroom  Taken 5/31/2023 1400 by Ariane Epps RN  Activity Management:   up ad mikel   ambulated in room   ambulated to bathroom  Taken 5/31/2023 1200 by Ariane Epps RN  Activity Management: up ad mikel  Taken 5/31/2023 1000 by Ariane Epps RN  Activity Management: up ad mikel  Taken 5/31/2023 0800 by Ariane Epps RN  Activity Management:   ambulated in room   ambulated to bathroom  VTE Prevention/Management: (lovenox) other (see comments)  Intervention: Prevent Infection  Recent Flowsheet Documentation  Taken 5/31/2023 1600 by Ariane Epps RN  Infection  Prevention:   environmental surveillance performed   hand hygiene promoted   personal protective equipment utilized   rest/sleep promoted  Taken 5/31/2023 1400 by Ariane Epps RN  Infection Prevention:   environmental surveillance performed   hand hygiene promoted   personal protective equipment utilized  Taken 5/31/2023 1200 by Ariane Epps RN  Infection Prevention:   environmental surveillance performed   hand hygiene promoted   personal protective equipment utilized  Taken 5/31/2023 1000 by Ariane Epps RN  Infection Prevention:   environmental surveillance performed   hand hygiene promoted   personal protective equipment utilized  Taken 5/31/2023 0800 by Ariane Epps RN  Infection Prevention:   environmental surveillance performed   personal protective equipment utilized   rest/sleep promoted   hand hygiene promoted  Goal: Optimal Comfort and Wellbeing  Outcome: Ongoing, Progressing  Intervention: Provide Person-Centered Care  Recent Flowsheet Documentation  Taken 5/31/2023 1600 by Ariane Epps RN  Trust Relationship/Rapport:   care explained   choices provided   emotional support provided   empathic listening provided   questions answered   questions encouraged   reassurance provided   thoughts/feelings acknowledged  Taken 5/31/2023 1400 by Ariane Epps RN  Trust Relationship/Rapport:   care explained   choices provided   emotional support provided   questions answered   questions encouraged   reassurance provided   thoughts/feelings acknowledged  Taken 5/31/2023 1200 by Ariane Epps RN  Trust Relationship/Rapport:   care explained   choices provided   emotional support provided   empathic listening provided   questions answered   questions encouraged   thoughts/feelings acknowledged  Taken 5/31/2023 1000 by Ariane Epsp RN  Trust Relationship/Rapport:   care explained   choices provided   emotional support provided   questions encouraged   reassurance provided    thoughts/feelings acknowledged   questions answered   empathic listening provided  Taken 5/31/2023 0800 by Ariane Epps RN  Trust Relationship/Rapport:   care explained   choices provided   questions answered   questions encouraged   reassurance provided   thoughts/feelings acknowledged   empathic listening provided   emotional support provided  Goal: Readiness for Transition of Care  Outcome: Ongoing, Progressing     Problem: Fall Injury Risk  Goal: Absence of Fall and Fall-Related Injury  Outcome: Ongoing, Progressing  Intervention: Identify and Manage Contributors  Recent Flowsheet Documentation  Taken 5/31/2023 1000 by Ariane Epps RN  Medication Review/Management: medications reviewed  Taken 5/31/2023 0800 by Ariane Epps RN  Medication Review/Management: medications reviewed  Intervention: Promote Injury-Free Environment  Recent Flowsheet Documentation  Taken 5/31/2023 1600 by Ariane Epps RN  Safety Promotion/Fall Prevention:   activity supervised   assistive device/personal items within reach   clutter free environment maintained   room organization consistent   safety round/check completed   toileting scheduled  Taken 5/31/2023 1400 by Ariane Epps RN  Safety Promotion/Fall Prevention:   activity supervised   assistive device/personal items within reach   clutter free environment maintained   toileting scheduled   safety round/check completed   room organization consistent  Taken 5/31/2023 1200 by Ariane Epps RN  Safety Promotion/Fall Prevention:   activity supervised   assistive device/personal items within reach   clutter free environment maintained   toileting scheduled   safety round/check completed   room organization consistent  Taken 5/31/2023 1000 by Ariane Epps RN  Safety Promotion/Fall Prevention:   activity supervised   assistive device/personal items within reach   clutter free environment maintained   toileting scheduled   safety round/check  completed   room organization consistent  Taken 5/31/2023 0800 by Ariane Epps, RN  Safety Promotion/Fall Prevention:   activity supervised   assistive device/personal items within reach   clutter free environment maintained   toileting scheduled   safety round/check completed   room organization consistent     Problem: Diabetes Comorbidity  Goal: Blood Glucose Level Within Targeted Range  Outcome: Ongoing, Progressing     Problem: Obstructive Sleep Apnea Risk or Actual Comorbidity Management  Goal: Unobstructed Breathing During Sleep  Outcome: Ongoing, Progressing   Goal Outcome Evaluation:  Plan of Care Reviewed With: patient        Progress: improving

## 2023-06-01 VITALS
SYSTOLIC BLOOD PRESSURE: 127 MMHG | HEIGHT: 67 IN | DIASTOLIC BLOOD PRESSURE: 77 MMHG | OXYGEN SATURATION: 95 % | TEMPERATURE: 98 F | WEIGHT: 232.59 LBS | RESPIRATION RATE: 18 BRPM | HEART RATE: 87 BPM | BODY MASS INDEX: 36.51 KG/M2

## 2023-06-01 PROBLEM — E66.9 OBESITY (BMI 30-39.9): Status: ACTIVE | Noted: 2023-06-01

## 2023-06-01 LAB
GLUCOSE BLDC GLUCOMTR-MCNC: 116 MG/DL (ref 70–130)
GLUCOSE BLDC GLUCOMTR-MCNC: 130 MG/DL (ref 70–130)

## 2023-06-01 PROCEDURE — 82948 REAGENT STRIP/BLOOD GLUCOSE: CPT

## 2023-06-01 PROCEDURE — 25010000002 KETOROLAC TROMETHAMINE PER 15 MG: Performed by: STUDENT IN AN ORGANIZED HEALTH CARE EDUCATION/TRAINING PROGRAM

## 2023-06-01 PROCEDURE — 25010000002 ENOXAPARIN PER 10 MG: Performed by: STUDENT IN AN ORGANIZED HEALTH CARE EDUCATION/TRAINING PROGRAM

## 2023-06-01 RX ADMIN — POLYETHYLENE GLYCOL 3350 17 G: 17 POWDER, FOR SOLUTION ORAL at 09:17

## 2023-06-01 RX ADMIN — FAMOTIDINE 40 MG: 20 TABLET ORAL at 09:17

## 2023-06-01 RX ADMIN — VENLAFAXINE HYDROCHLORIDE 150 MG: 75 CAPSULE, EXTENDED RELEASE ORAL at 09:21

## 2023-06-01 RX ADMIN — CETIRIZINE HYDROCHLORIDE 10 MG: 10 TABLET, FILM COATED ORAL at 09:17

## 2023-06-01 RX ADMIN — BACLOFEN 5 MG: 10 TABLET ORAL at 05:35

## 2023-06-01 RX ADMIN — ENOXAPARIN SODIUM 40 MG: 100 INJECTION SUBCUTANEOUS at 09:17

## 2023-06-01 RX ADMIN — KETOROLAC TROMETHAMINE 30 MG: 30 INJECTION, SOLUTION INTRAMUSCULAR; INTRAVENOUS at 11:35

## 2023-06-01 RX ADMIN — ACETAMINOPHEN 1000 MG: 500 TABLET ORAL at 09:17

## 2023-06-01 RX ADMIN — BACLOFEN 5 MG: 10 TABLET ORAL at 13:02

## 2023-06-01 RX ADMIN — KETOROLAC TROMETHAMINE 30 MG: 30 INJECTION, SOLUTION INTRAMUSCULAR; INTRAVENOUS at 05:35

## 2023-06-01 RX ADMIN — KETOROLAC TROMETHAMINE 30 MG: 30 INJECTION, SOLUTION INTRAMUSCULAR; INTRAVENOUS at 00:29

## 2023-06-01 RX ADMIN — SPIRONOLACTONE 25 MG: 25 TABLET ORAL at 09:17

## 2023-06-01 NOTE — DISCHARGE SUMMARY
"  Patient Name: Luanne Forrester  MRN: [id number]  : 1970  DOS: 2023    Attending: Yonatan Eric MD    Primary Care Provider: Sandee Valadez APRN    Date of Admission:.2023 10:18 AM    Date of Discharge:  2023    Discharge Diagnosis:   Rectal prolapse    HTN (hypertension)    GERD (gastroesophageal reflux disease)    Sleep apnea    Prediabetes    s/p rectopexy    Acute postoperative pain      Hospital Course  At admit    ***    After admit    *** was provided pain medication as needed for pain control.  *** received DVT prophylaxis with subcutaneous *** as well as mechanicals      Patient was started on clear liquid diet, this was advanced when ***showed evidence of bowel function return.      Tolerated diet without difficulty.    ***  used an IS for atelectasis prophylaxis.    Home medications were resumed as appropriate, and labs were monitored and remained fairly stable.    With the progress *** has made, pt is ready for DC home today.      Discussed with patient regarding plan and ***shows understanding and agreement.       Procedures Performed  Procedure(s):  RECTOPEXY WITH DAVINCI ROBOT       Pertinent Test Results:    I reviewed the patient's new clinical results.   Results from last 7 days   Lab Units 23  0348   HEMOGLOBIN g/dL 11.5*   HEMATOCRIT % 36.3     Results from last 7 days   Lab Units 23  0348   SODIUM mmol/L 141   POTASSIUM mmol/L 4.9   CHLORIDE mmol/L 105   CO2 mmol/L 25.0   BUN mg/dL 7   CREATININE mg/dL 0.51*   CALCIUM mg/dL 9.0   GLUCOSE mg/dL 218*     I reviewed the patient's new imaging including images and reports.          Physical therapy    Discharge Assessment:    Vital Signs  Visit Vitals  /77 (BP Location: Left arm, Patient Position: Sitting)   Pulse 87   Temp 98 °F (36.7 °C) (Oral)   Resp 18   Ht 168.9 cm (66.5\")   Wt 105 kg (232 lb 9.4 oz)   SpO2 95%   BMI 36.98 kg/m²     Temp (24hrs), Av.3 °F (36.8 °C), Min:98 °F (36.7 " °C), Max:98.5 °F (36.9 °C)      General Appearance:    Alert, cooperative, in no acute distress   Lungs:     Clear to auscultation,respirations regular, even and                   unlabored    Heart:    Regular rhythm and normal rate, normal S1 and S2    Abdomen:   Soft and benign. Obese. Clean incisions   Extremities:   Moves all extremities well, no edema, no cyanosis, no              redness   Pulses:   Pulses palpable and equal bilaterally   Skin:   No bleeding, bruising or rash            Discharge Disposition:          Discharge Medications      New Medications      Instructions Start Date   polyethylene glycol 17 GM/SCOOP powder  Commonly known as: MIRALAX   Mix 17 grams (1 capful) in 8 ounces of liquid and take by mouth once daily for 14 days.         Changes to Medications      Instructions Start Date   baclofen 10 MG tablet  Commonly known as: LIORESAL  What changed:   · medication strength  · when to take this  · reasons to take this   Take 0.5 (one-half) tablet by mouth Every 8 (Eight) Hours for 7 days.      traMADol 50 MG tablet  Commonly known as: ULTRAM  What changed:   · when to take this  · reasons to take this   50 mg, Oral, Every 6 Hours PRN         Continue These Medications      Instructions Start Date   albuterol sulfate  (90 Base) MCG/ACT inhaler  Commonly known as: PROVENTIL HFA;VENTOLIN HFA;PROAIR HFA   2 puffs, Inhalation, Every 6 Hours PRN      cetirizine 10 MG tablet  Commonly known as: zyrTEC   10 mg, Oral, Daily      cyanocobalamin 1000 MCG/ML injection   1,000 mcg, Intramuscular, Every 28 Days      famotidine 40 MG tablet  Commonly known as: PEPCID   40 mg, Oral, 2 Times Daily      fluticasone 50 MCG/ACT nasal spray  Commonly known as: FLONASE   2 sprays, Nasal, Daily PRN      losartan 100 MG tablet  Commonly known as: COZAAR   100 mg, Oral, Daily      meloxicam 7.5 MG tablet  Commonly known as: MOBIC   7.5 mg, Oral, Daily      montelukast 10 MG tablet  Commonly known as:  SINGULAIR   10 mg, Oral, Nightly      Myrbetriq 50 MG tablet sustained-release 24 hour 24 hr tablet  Generic drug: Mirabegron ER   50 mg, Oral, Daily      spironolactone 25 MG tablet  Commonly known as: ALDACTONE   25 mg, Oral, Daily      venlafaxine  MG 24 hr capsule  Commonly known as: EFFEXOR-XR   150 mg, Oral, Daily         Stop These Medications    metroNIDAZOLE 500 MG tablet  Commonly known as: FLAGYL     mupirocin 2 % ointment  Commonly known as: BACTROBAN     neomycin 500 MG tablet  Commonly known as: MYCIFRADIN            Discharge Diet:     Activity at Discharge:     Follow-up Appointments:  No future appointments.     Dragon disclaimer:  Part of this encounter note is an electronic transcription/translation of spoken language to printed text. The electronic translation of spoken language may permit erroneous, or at times, nonsensical words or phrases to be inadvertently transcribed; Although I have reviewed the note for such errors, some may still exist.       Markel Salazar MD  06/01/23  11:45 EDT

## 2023-06-01 NOTE — PROGRESS NOTES
"[facility] ([facility]    2 Days Post-Op   Length of stay: 2 days    Subjective:  Patient is doing well today.  Pain is well controlled.  Continues to pass flatus and had bowel movement.  Denies any fevers or chills.    Physical Exam:  Blood pressure 117/65, pulse 83, temperature 98.4 °F (36.9 °C), temperature source Oral, resp. rate 18, height 168.9 cm (66.5\"), weight 105 kg (232 lb 9.4 oz), SpO2 96 %.    Abd:Soft, appropriately tender, incisions are clean dry and intact.  Nondistended    Labs past 24 hours:  Lab Results (last 24 hours)     Procedure Component Value Units Date/Time    POC Glucose Once [[id number]]  (Abnormal) Collected: 05/31/23 1952    Specimen: Blood Updated: 05/31/23 1955     Glucose 181 mg/dL      Comment: Meter: [id number] : [id number] Meredith Key       POC Glucose Once [[id number]]  (Abnormal) Collected: 05/31/23 1632    Specimen: Blood Updated: 05/31/23 1634     Glucose 155 mg/dL      Comment: Meter: [id number] : [id number] Due[address]as Pat       POC Glucose Once [[id number]]  (Abnormal) Collected: 05/31/23 1116    Specimen: Blood Updated: 05/31/23 1118     Glucose 150 mg/dL      Comment: Meter: [id number] : [id number] Mora Dajae       POC Glucose Once [[id number]]  (Abnormal) Collected: 05/31/23 0755    Specimen: Blood Updated: 05/31/23 0756     Glucose 139 mg/dL      Comment: Meter: [id number] : [id number] Mora Dajae             I/O last shift:  No intake/output data recorded.       Pathology:  Order Name Source Comment Collection Info Order Time   BASIC METABOLIC PANEL   Collected By: Kathleen Goel 5/30/2023 10:04 PM   MAGNESIUM   Collected By: Kathleen Goel 5/30/2023 10:04 PM     Release to patient   Routine Release        HEMOGLOBIN AND HEMATOCRIT, BLOOD   Collected By: Kathleen Goel 5/30/2023 10:04 PM   .    Assessment and Plan:  53-year-old female postop day 2 from a robotic rectopexy for rectal prolapse.  Overall she is " doing well with return of bowel function.  I think clear for discharge today    Plan  She is cleared to discharge from my perspective  I gave her warning signs and symptoms of when she is to call the clinic or return to the hospital  Neck called in a several day supply of an increased frequency in her home baclofen as well as some oxycodone  She will see me in 2 weeks for reevaluation  She should go home with some MiraLAX as I have prescribed in order to prevent any sort of constipation or straining.    Yonatan Eric MD  [facility] ([facility]  06/01/23  07:02 EDT

## 2023-06-06 ENCOUNTER — HOSPITAL ENCOUNTER (OUTPATIENT)
Dept: HOSPITAL 22 - SC.PAINP | Age: 53
Discharge: HOME | End: 2023-06-06
Payer: COMMERCIAL

## 2023-06-06 VITALS
DIASTOLIC BLOOD PRESSURE: 70 MMHG | TEMPERATURE: 97.6 F | HEART RATE: 74 BPM | RESPIRATION RATE: 18 BRPM | OXYGEN SATURATION: 97 % | SYSTOLIC BLOOD PRESSURE: 122 MMHG

## 2023-06-06 VITALS
SYSTOLIC BLOOD PRESSURE: 108 MMHG | OXYGEN SATURATION: 97 % | HEART RATE: 66 BPM | DIASTOLIC BLOOD PRESSURE: 69 MMHG | RESPIRATION RATE: 18 BRPM

## 2023-06-06 VITALS — BODY MASS INDEX: 36.8 KG/M2

## 2023-06-06 DIAGNOSIS — M79.18: Primary | ICD-10-CM

## 2023-06-06 PROCEDURE — 20552 NJX 1/MLT TRIGGER POINT 1/2: CPT

## 2023-06-21 LAB
QT INTERVAL: 394 MS
QTC INTERVAL: 471 MS

## 2023-06-28 ENCOUNTER — HOSPITAL ENCOUNTER (OUTPATIENT)
Age: 53
End: 2023-06-28
Payer: COMMERCIAL

## 2023-06-28 VITALS
DIASTOLIC BLOOD PRESSURE: 97 MMHG | HEART RATE: 91 BPM | OXYGEN SATURATION: 96 % | SYSTOLIC BLOOD PRESSURE: 147 MMHG | RESPIRATION RATE: 18 BRPM

## 2023-06-28 VITALS — BODY MASS INDEX: 36.8 KG/M2

## 2023-06-28 DIAGNOSIS — M79.10: ICD-10-CM

## 2023-06-28 DIAGNOSIS — M48.02: ICD-10-CM

## 2023-06-28 DIAGNOSIS — M50.10: Primary | ICD-10-CM

## 2023-06-28 DIAGNOSIS — M51.16: ICD-10-CM

## 2023-06-28 DIAGNOSIS — M19.011: ICD-10-CM

## 2023-06-28 DIAGNOSIS — M19.012: ICD-10-CM

## 2023-06-28 PROCEDURE — 99212 OFFICE O/P EST SF 10 MIN: CPT

## 2023-06-28 PROCEDURE — G0463 HOSPITAL OUTPT CLINIC VISIT: HCPCS

## 2023-07-03 ENCOUNTER — HOSPITAL ENCOUNTER (OUTPATIENT)
Age: 53
End: 2023-07-03
Payer: COMMERCIAL

## 2023-07-03 DIAGNOSIS — M79.672: ICD-10-CM

## 2023-07-03 DIAGNOSIS — E11.9: ICD-10-CM

## 2023-07-03 DIAGNOSIS — Z79.899: ICD-10-CM

## 2023-07-03 DIAGNOSIS — L84: ICD-10-CM

## 2023-07-03 DIAGNOSIS — M19.072: ICD-10-CM

## 2023-07-03 DIAGNOSIS — R20.2: ICD-10-CM

## 2023-07-03 DIAGNOSIS — M79.671: ICD-10-CM

## 2023-07-03 DIAGNOSIS — G57.82: Primary | ICD-10-CM

## 2023-07-03 DIAGNOSIS — M19.071: ICD-10-CM

## 2023-07-03 LAB
ALBUMIN LEVEL: 3.9 G/DL (ref 3.5–5)
ALBUMIN/GLOB SERPL: 1.3 {RATIO} (ref 1.1–1.8)
ALP ISO SERPL-ACNC: 171 U/L (ref 38–126)
ALT SERPLBLD-CCNC: 30 U/L (ref 12–78)
ANION GAP SERPL CALC-SCNC: 11.1 MEQ/L (ref 5–15)
AST SERPL QL: 27 U/L (ref 14–36)
BILIRUBIN,TOTAL: < 0.1 MG/DL (ref 0.2–1.3)
BUN SERPL-MCNC: 13 MG/DL (ref 7–17)
CALCIUM SPEC-MCNC: 9.3 MG/DL (ref 8.4–10.2)
CHLORIDE SPEC-SCNC: 104 MMOL/L (ref 98–107)
CO2 SERPL-SCNC: 29 MMOL/L (ref 22–30)
CREAT BLD-SCNC: 0.6 MG/DL (ref 0.52–1.04)
CRP SERPL HS-MCNC: 16 MG/L (ref 0–4)
ESTIMATED GLOMERULAR FILT RATE: 105 ML/MIN (ref 60–?)
GFR (AFRICAN AMERICAN): 128 ML/MIN (ref 60–?)
GLOBULIN SER CALC-MCNC: 2.9 G/DL (ref 1.3–3.2)
GLUCOSE: 99 MG/DL (ref 74–100)
HBA1C MFR BLD: 6.6 % (ref 4–6)
HCT VFR BLD CALC: 39.8 % (ref 37–47)
HGB BLD-MCNC: 12.7 G/DL (ref 12.2–16.2)
MCHC RBC-ENTMCNC: 31.8 G/DL (ref 31.8–35.4)
MCV RBC: 86 FL (ref 81–99)
MEAN CORPUSCULAR HEMOGLOBIN: 27.4 PG (ref 27–31.2)
PLATELET # BLD: 497 K/MM3 (ref 142–424)
POTASSIUM: 4.1 MMOL/L (ref 3.5–5.1)
PROT SERPL-MCNC: 6.8 G/DL (ref 6.3–8.2)
RBC # BLD AUTO: 4.62 M/MM3 (ref 4.2–5.4)
SODIUM SPEC-SCNC: 140 MMOL/L (ref 136–145)
WBC # BLD AUTO: 10.5 K/MM3 (ref 4.8–10.8)

## 2023-07-03 PROCEDURE — 80053 COMPREHEN METABOLIC PANEL: CPT

## 2023-07-03 PROCEDURE — 80323 ALKALOIDS NOS: CPT

## 2023-07-03 PROCEDURE — 85025 COMPLETE CBC W/AUTO DIFF WBC: CPT

## 2023-07-03 PROCEDURE — 36415 COLL VENOUS BLD VENIPUNCTURE: CPT

## 2023-07-03 PROCEDURE — 82652 VIT D 1 25-DIHYDROXY: CPT

## 2023-07-03 PROCEDURE — 86140 C-REACTIVE PROTEIN: CPT

## 2023-07-03 PROCEDURE — 71046 X-RAY EXAM CHEST 2 VIEWS: CPT

## 2023-07-03 PROCEDURE — 85651 RBC SED RATE NONAUTOMATED: CPT

## 2023-07-03 PROCEDURE — 83036 HEMOGLOBIN GLYCOSYLATED A1C: CPT

## 2023-07-03 PROCEDURE — 93005 ELECTROCARDIOGRAM TRACING: CPT

## 2023-07-10 LAB
1,25-DIHYDROXY, VITAMIN D-2: <10 PG/ML
1,25-DIHYDROXY, VITAMIN D-3: 61 PG/ML
VIT D25+D1,25 OH+D1,25 PNL SERPL-MCNC: 62 PG/ML

## 2023-07-13 ENCOUNTER — HOSPITAL ENCOUNTER (OUTPATIENT)
Age: 53
Discharge: HOME | End: 2023-07-13
Payer: COMMERCIAL

## 2023-07-13 VITALS
TEMPERATURE: 97.4 F | RESPIRATION RATE: 15 BRPM | OXYGEN SATURATION: 95 % | DIASTOLIC BLOOD PRESSURE: 82 MMHG | HEART RATE: 100 BPM | SYSTOLIC BLOOD PRESSURE: 153 MMHG

## 2023-07-13 VITALS
RESPIRATION RATE: 18 BRPM | DIASTOLIC BLOOD PRESSURE: 66 MMHG | SYSTOLIC BLOOD PRESSURE: 121 MMHG | TEMPERATURE: 97.5 F | OXYGEN SATURATION: 97 % | HEART RATE: 86 BPM

## 2023-07-13 VITALS
TEMPERATURE: 96.98 F | HEART RATE: 84 BPM | RESPIRATION RATE: 18 BRPM | OXYGEN SATURATION: 97 % | SYSTOLIC BLOOD PRESSURE: 134 MMHG | DIASTOLIC BLOOD PRESSURE: 83 MMHG

## 2023-07-13 VITALS
DIASTOLIC BLOOD PRESSURE: 71 MMHG | SYSTOLIC BLOOD PRESSURE: 130 MMHG | OXYGEN SATURATION: 97 % | TEMPERATURE: 97.5 F | RESPIRATION RATE: 18 BRPM | HEART RATE: 85 BPM

## 2023-07-13 VITALS
TEMPERATURE: 97.4 F | SYSTOLIC BLOOD PRESSURE: 153 MMHG | OXYGEN SATURATION: 95 % | HEART RATE: 100 BPM | RESPIRATION RATE: 20 BRPM | DIASTOLIC BLOOD PRESSURE: 82 MMHG

## 2023-07-13 VITALS
OXYGEN SATURATION: 95 % | RESPIRATION RATE: 17 BRPM | SYSTOLIC BLOOD PRESSURE: 134 MMHG | HEART RATE: 91 BPM | DIASTOLIC BLOOD PRESSURE: 84 MMHG

## 2023-07-13 VITALS
OXYGEN SATURATION: 96 % | DIASTOLIC BLOOD PRESSURE: 79 MMHG | HEART RATE: 83 BPM | TEMPERATURE: 97.52 F | RESPIRATION RATE: 18 BRPM | SYSTOLIC BLOOD PRESSURE: 110 MMHG

## 2023-07-13 VITALS
DIASTOLIC BLOOD PRESSURE: 77 MMHG | HEART RATE: 90 BPM | SYSTOLIC BLOOD PRESSURE: 134 MMHG | OXYGEN SATURATION: 95 % | RESPIRATION RATE: 17 BRPM

## 2023-07-13 VITALS
HEART RATE: 89 BPM | OXYGEN SATURATION: 95 % | SYSTOLIC BLOOD PRESSURE: 134 MMHG | RESPIRATION RATE: 17 BRPM | DIASTOLIC BLOOD PRESSURE: 78 MMHG

## 2023-07-13 VITALS — BODY MASS INDEX: 37.8 KG/M2

## 2023-07-13 DIAGNOSIS — M19.072: ICD-10-CM

## 2023-07-13 DIAGNOSIS — M25.372: ICD-10-CM

## 2023-07-13 DIAGNOSIS — T84.84XA: Primary | ICD-10-CM

## 2023-07-13 DIAGNOSIS — M65.9: ICD-10-CM

## 2023-07-13 DIAGNOSIS — Z79.01: ICD-10-CM

## 2023-07-13 DIAGNOSIS — E11.9: ICD-10-CM

## 2023-07-13 DIAGNOSIS — Z79.899: ICD-10-CM

## 2023-07-13 DIAGNOSIS — M76.72: ICD-10-CM

## 2023-07-13 LAB
GLUCOSE BLDC GLUCOMTR-MCNC: 114 MG/DL (ref 70–110)
GLUCOSE BLDC GLUCOMTR-MCNC: 119 MG/DL (ref 70–110)

## 2023-07-13 PROCEDURE — 28086 EXCISE FOOT TENDON SHEATH: CPT

## 2023-07-13 PROCEDURE — 20240 BONE BIOPSY OPEN SUPERFICIAL: CPT

## 2023-07-13 PROCEDURE — 73630 X-RAY EXAM OF FOOT: CPT

## 2023-07-13 PROCEDURE — 20680 REMOVAL OF IMPLANT DEEP: CPT

## 2023-07-13 PROCEDURE — 96374 THER/PROPH/DIAG INJ IV PUSH: CPT

## 2023-07-13 PROCEDURE — 27658 REPAIR OF LEG TENDON EACH: CPT

## 2023-07-13 PROCEDURE — 73620 X-RAY EXAM OF FOOT: CPT

## 2023-07-13 PROCEDURE — 87070 CULTURE OTHR SPECIMN AEROBIC: CPT

## 2023-07-13 PROCEDURE — 87205 SMEAR GRAM STAIN: CPT

## 2023-07-13 PROCEDURE — 73610 X-RAY EXAM OF ANKLE: CPT

## 2023-07-13 PROCEDURE — C1762 CONN TISS, HUMAN(INC FASCIA): HCPCS

## 2023-07-13 PROCEDURE — 82962 GLUCOSE BLOOD TEST: CPT

## 2023-07-14 VITALS — SYSTOLIC BLOOD PRESSURE: 134 MMHG | HEART RATE: 89 BPM | DIASTOLIC BLOOD PRESSURE: 78 MMHG

## 2023-07-18 ENCOUNTER — HOSPITAL ENCOUNTER (OUTPATIENT)
Age: 53
Discharge: HOME | End: 2023-07-18
Payer: COMMERCIAL

## 2023-07-18 VITALS
SYSTOLIC BLOOD PRESSURE: 126 MMHG | OXYGEN SATURATION: 98 % | DIASTOLIC BLOOD PRESSURE: 68 MMHG | RESPIRATION RATE: 18 BRPM | HEART RATE: 70 BPM

## 2023-07-18 VITALS
OXYGEN SATURATION: 98 % | SYSTOLIC BLOOD PRESSURE: 126 MMHG | RESPIRATION RATE: 18 BRPM | HEART RATE: 70 BPM | DIASTOLIC BLOOD PRESSURE: 68 MMHG

## 2023-07-18 VITALS
OXYGEN SATURATION: 100 % | HEART RATE: 67 BPM | DIASTOLIC BLOOD PRESSURE: 76 MMHG | RESPIRATION RATE: 18 BRPM | SYSTOLIC BLOOD PRESSURE: 120 MMHG

## 2023-07-18 VITALS
OXYGEN SATURATION: 100 % | HEART RATE: 98 BPM | SYSTOLIC BLOOD PRESSURE: 121 MMHG | TEMPERATURE: 96.98 F | DIASTOLIC BLOOD PRESSURE: 74 MMHG | RESPIRATION RATE: 18 BRPM

## 2023-07-18 VITALS — BODY MASS INDEX: 36.6 KG/M2

## 2023-07-18 DIAGNOSIS — M25.512: ICD-10-CM

## 2023-07-18 DIAGNOSIS — M19.012: ICD-10-CM

## 2023-07-18 DIAGNOSIS — G89.29: ICD-10-CM

## 2023-07-18 DIAGNOSIS — M25.511: ICD-10-CM

## 2023-07-18 DIAGNOSIS — M19.011: Primary | ICD-10-CM

## 2023-07-18 PROCEDURE — 20610 DRAIN/INJ JOINT/BURSA W/O US: CPT

## 2023-07-20 ENCOUNTER — HOSPITAL ENCOUNTER (OUTPATIENT)
Age: 53
End: 2023-07-20
Payer: COMMERCIAL

## 2023-07-20 DIAGNOSIS — D72.829: Primary | ICD-10-CM

## 2023-07-20 LAB
ANION GAP SERPL CALC-SCNC: 10.5 MEQ/L (ref 5–15)
BUN SERPL-MCNC: 18 MG/DL (ref 7–17)
CALCIUM SPEC-MCNC: 9.5 MG/DL (ref 8.4–10.2)
CELLS COUNTED: 100
CHLORIDE SPEC-SCNC: 106 MMOL/L (ref 98–107)
CO2 SERPL-SCNC: 29 MMOL/L (ref 22–30)
CREAT BLD-SCNC: 0.6 MG/DL (ref 0.52–1.04)
ESTIMATED GLOMERULAR FILT RATE: 105 ML/MIN (ref 60–?)
GFR (AFRICAN AMERICAN): 128 ML/MIN (ref 60–?)
GLUCOSE: 112 MG/DL (ref 74–100)
HCT VFR BLD CALC: 37.6 % (ref 37–47)
HGB BLD-MCNC: 11.4 G/DL (ref 12.2–16.2)
MANUAL DIFFERENTIAL: (no result)
MCHC RBC-ENTMCNC: 30.2 G/DL (ref 31.8–35.4)
MCV RBC: 88.7 FL (ref 81–99)
MEAN CORPUSCULAR HEMOGLOBIN: 26.8 PG (ref 27–31.2)
PLATELET # BLD: 496 K/MM3 (ref 142–424)
POTASSIUM: 4.5 MMOL/L (ref 3.5–5.1)
RBC # BLD AUTO: 4.24 M/MM3 (ref 4.2–5.4)
SODIUM SPEC-SCNC: 141 MMOL/L (ref 136–145)
WBC # BLD AUTO: 15.5 K/MM3 (ref 4.8–10.8)

## 2023-07-20 PROCEDURE — 36415 COLL VENOUS BLD VENIPUNCTURE: CPT

## 2023-07-20 PROCEDURE — 80048 BASIC METABOLIC PNL TOTAL CA: CPT

## 2023-07-20 PROCEDURE — 85025 COMPLETE CBC W/AUTO DIFF WBC: CPT

## 2023-07-20 PROCEDURE — 85007 BL SMEAR W/DIFF WBC COUNT: CPT

## 2023-07-28 ENCOUNTER — HOSPITAL ENCOUNTER (OUTPATIENT)
Age: 53
End: 2023-07-28
Payer: COMMERCIAL

## 2023-07-28 DIAGNOSIS — M25.571: Primary | ICD-10-CM

## 2023-08-02 ENCOUNTER — HOSPITAL ENCOUNTER (OUTPATIENT)
Age: 53
Discharge: HOME | End: 2023-08-02
Payer: COMMERCIAL

## 2023-08-02 VITALS — BODY MASS INDEX: 36.8 KG/M2

## 2023-08-02 VITALS
OXYGEN SATURATION: 96 % | HEART RATE: 84 BPM | RESPIRATION RATE: 18 BRPM | SYSTOLIC BLOOD PRESSURE: 128 MMHG | DIASTOLIC BLOOD PRESSURE: 92 MMHG

## 2023-08-02 DIAGNOSIS — M25.511: ICD-10-CM

## 2023-08-02 DIAGNOSIS — M48.02: ICD-10-CM

## 2023-08-02 DIAGNOSIS — M51.16: ICD-10-CM

## 2023-08-02 DIAGNOSIS — M79.10: ICD-10-CM

## 2023-08-02 DIAGNOSIS — M25.512: ICD-10-CM

## 2023-08-02 DIAGNOSIS — M50.10: Primary | ICD-10-CM

## 2023-08-02 PROCEDURE — 99212 OFFICE O/P EST SF 10 MIN: CPT

## 2023-08-02 PROCEDURE — G0463 HOSPITAL OUTPT CLINIC VISIT: HCPCS

## 2023-08-15 ENCOUNTER — HOSPITAL ENCOUNTER (OUTPATIENT)
Age: 53
Discharge: HOME | End: 2023-08-15
Payer: COMMERCIAL

## 2023-08-15 VITALS
HEART RATE: 73 BPM | SYSTOLIC BLOOD PRESSURE: 149 MMHG | OXYGEN SATURATION: 96 % | RESPIRATION RATE: 18 BRPM | DIASTOLIC BLOOD PRESSURE: 97 MMHG

## 2023-08-15 VITALS
RESPIRATION RATE: 18 BRPM | SYSTOLIC BLOOD PRESSURE: 136 MMHG | HEART RATE: 67 BPM | OXYGEN SATURATION: 98 % | DIASTOLIC BLOOD PRESSURE: 85 MMHG

## 2023-08-15 VITALS
SYSTOLIC BLOOD PRESSURE: 149 MMHG | HEART RATE: 73 BPM | RESPIRATION RATE: 18 BRPM | DIASTOLIC BLOOD PRESSURE: 97 MMHG | OXYGEN SATURATION: 97 %

## 2023-08-15 VITALS
SYSTOLIC BLOOD PRESSURE: 139 MMHG | TEMPERATURE: 98.1 F | DIASTOLIC BLOOD PRESSURE: 73 MMHG | HEART RATE: 84 BPM | RESPIRATION RATE: 18 BRPM | OXYGEN SATURATION: 98 %

## 2023-08-15 VITALS — BODY MASS INDEX: 36.6 KG/M2

## 2023-08-15 DIAGNOSIS — M25.512: Primary | ICD-10-CM

## 2023-08-15 DIAGNOSIS — G89.29: ICD-10-CM

## 2023-08-15 PROCEDURE — 64418 NJX AA&/STRD SPRSCAP NRV: CPT

## 2023-08-25 ENCOUNTER — HOSPITAL ENCOUNTER (OUTPATIENT)
Age: 53
End: 2023-08-25
Payer: COMMERCIAL

## 2023-08-25 DIAGNOSIS — M25.571: Primary | ICD-10-CM

## 2023-08-25 DIAGNOSIS — M25.371: ICD-10-CM

## 2023-08-25 DIAGNOSIS — G89.29: ICD-10-CM

## 2023-08-25 PROCEDURE — 73721 MRI JNT OF LWR EXTRE W/O DYE: CPT

## 2023-08-28 ENCOUNTER — HOSPITAL ENCOUNTER (OUTPATIENT)
Age: 53
Discharge: HOME | End: 2023-08-28
Payer: COMMERCIAL

## 2023-08-28 ENCOUNTER — HOSPITAL ENCOUNTER (OUTPATIENT)
Age: 53
End: 2023-08-28
Payer: COMMERCIAL

## 2023-08-28 VITALS
OXYGEN SATURATION: 98 % | SYSTOLIC BLOOD PRESSURE: 140 MMHG | DIASTOLIC BLOOD PRESSURE: 74 MMHG | HEART RATE: 72 BPM | RESPIRATION RATE: 18 BRPM

## 2023-08-28 VITALS — BODY MASS INDEX: 36 KG/M2

## 2023-08-28 DIAGNOSIS — E11.9: ICD-10-CM

## 2023-08-28 DIAGNOSIS — R06.02: Primary | ICD-10-CM

## 2023-08-28 DIAGNOSIS — R00.2: ICD-10-CM

## 2023-08-28 DIAGNOSIS — M79.10: ICD-10-CM

## 2023-08-28 DIAGNOSIS — I11.9: ICD-10-CM

## 2023-08-28 DIAGNOSIS — G47.33: ICD-10-CM

## 2023-08-28 DIAGNOSIS — R94.30: ICD-10-CM

## 2023-08-28 DIAGNOSIS — I63.9: ICD-10-CM

## 2023-08-28 DIAGNOSIS — M25.512: ICD-10-CM

## 2023-08-28 DIAGNOSIS — R94.31: ICD-10-CM

## 2023-08-28 DIAGNOSIS — R60.0: ICD-10-CM

## 2023-08-28 DIAGNOSIS — M51.16: ICD-10-CM

## 2023-08-28 DIAGNOSIS — M48.02: ICD-10-CM

## 2023-08-28 DIAGNOSIS — R06.00: ICD-10-CM

## 2023-08-28 DIAGNOSIS — M50.10: Primary | ICD-10-CM

## 2023-08-28 LAB
ALBUMIN LEVEL: 4.2 G/DL (ref 3.5–5)
ALP ISO SERPL-ACNC: 178 U/L (ref 38–126)
ALT SERPLBLD-CCNC: 31 U/L (ref 12–78)
ANION GAP SERPL CALC-SCNC: 14.6 MEQ/L (ref 5–15)
AST SERPL QL: 20 U/L (ref 14–36)
BILIRUB DIRECT SERPL-MCNC: 0.1 MG/DL (ref 0–0.4)
BILIRUB INDIRECT SERPL-MCNC: 0.4 MG/DL (ref 0–0.9)
BILIRUB INDIRECT SERPL-MCNC: 0.4 MG/DL (ref 0–1.1)
BILIRUBIN,TOTAL: 0.4 MG/DL (ref 0.2–1.3)
BUN SERPL-MCNC: 27 MG/DL (ref 7–17)
CALCIUM SPEC-MCNC: 9.6 MG/DL (ref 8.4–10.2)
CELLS COUNTED: 100
CHLORIDE SPEC-SCNC: 101 MMOL/L (ref 98–107)
CHOLEST SPEC-SCNC: 257 MG/DL (ref 140–200)
CO2 SERPL-SCNC: 25 MMOL/L (ref 22–30)
CREAT BLD-SCNC: 0.7 MG/DL (ref 0.52–1.04)
ESTIMATED GLOMERULAR FILT RATE: 88 ML/MIN (ref 60–?)
GFR (AFRICAN AMERICAN): 107 ML/MIN (ref 60–?)
GLUCOSE: 99 MG/DL (ref 74–100)
HCT VFR BLD CALC: 47.5 % (ref 37–47)
HDLC SERPL-MCNC: 62 MG/DL (ref 40–60)
HGB BLD-MCNC: 15.3 G/DL (ref 12.2–16.2)
MANUAL DIFFERENTIAL: (no result)
MCHC RBC-ENTMCNC: 32.1 G/DL (ref 31.8–35.4)
MCV RBC: 88.2 FL (ref 81–99)
MEAN CORPUSCULAR HEMOGLOBIN: 28.3 PG (ref 27–31.2)
PLATELET # BLD: 496 K/MM3 (ref 142–424)
POTASSIUM: 4.6 MMOL/L (ref 3.5–5.1)
PROT SERPL-MCNC: 7.8 G/DL (ref 6.3–8.2)
RBC # BLD AUTO: 5.39 M/MM3 (ref 4.2–5.4)
SODIUM SPEC-SCNC: 136 MMOL/L (ref 136–145)
T4 FREE SERPL-MCNC: 1.11 NG/DL (ref 0.78–2.19)
TRIGLYCERIDES: 184 MG/DL (ref 30–150)
TSH SERPL-ACNC: 2.04 UIU/ML (ref 0.47–4.68)
WBC # BLD AUTO: 22 K/MM3 (ref 4.8–10.8)

## 2023-08-28 PROCEDURE — 84443 ASSAY THYROID STIM HORMONE: CPT

## 2023-08-28 PROCEDURE — 85025 COMPLETE CBC W/AUTO DIFF WBC: CPT

## 2023-08-28 PROCEDURE — 80048 BASIC METABOLIC PNL TOTAL CA: CPT

## 2023-08-28 PROCEDURE — G0463 HOSPITAL OUTPT CLINIC VISIT: HCPCS

## 2023-08-28 PROCEDURE — 80061 LIPID PANEL: CPT

## 2023-08-28 PROCEDURE — 84439 ASSAY OF FREE THYROXINE: CPT

## 2023-08-28 PROCEDURE — 85007 BL SMEAR W/DIFF WBC COUNT: CPT

## 2023-08-28 PROCEDURE — 71275 CT ANGIOGRAPHY CHEST: CPT

## 2023-08-28 PROCEDURE — 99212 OFFICE O/P EST SF 10 MIN: CPT

## 2023-08-28 PROCEDURE — 36415 COLL VENOUS BLD VENIPUNCTURE: CPT

## 2023-08-28 PROCEDURE — 80076 HEPATIC FUNCTION PANEL: CPT

## 2023-09-05 ENCOUNTER — HOSPITAL ENCOUNTER (OUTPATIENT)
Age: 53
Discharge: HOME | End: 2023-09-05
Payer: COMMERCIAL

## 2023-09-05 VITALS
OXYGEN SATURATION: 97 % | SYSTOLIC BLOOD PRESSURE: 131 MMHG | DIASTOLIC BLOOD PRESSURE: 88 MMHG | HEART RATE: 62 BPM | RESPIRATION RATE: 18 BRPM

## 2023-09-05 VITALS — BODY MASS INDEX: 36 KG/M2

## 2023-09-05 VITALS — SYSTOLIC BLOOD PRESSURE: 130 MMHG | HEART RATE: 66 BPM | DIASTOLIC BLOOD PRESSURE: 80 MMHG | RESPIRATION RATE: 20 BRPM

## 2023-09-05 VITALS
OXYGEN SATURATION: 97 % | SYSTOLIC BLOOD PRESSURE: 131 MMHG | RESPIRATION RATE: 18 BRPM | HEART RATE: 62 BPM | DIASTOLIC BLOOD PRESSURE: 88 MMHG

## 2023-09-05 VITALS
HEART RATE: 72 BPM | SYSTOLIC BLOOD PRESSURE: 133 MMHG | DIASTOLIC BLOOD PRESSURE: 74 MMHG | RESPIRATION RATE: 16 BRPM | OXYGEN SATURATION: 98 % | TEMPERATURE: 97.4 F

## 2023-09-05 DIAGNOSIS — M50.123: Primary | ICD-10-CM

## 2023-09-05 PROCEDURE — 62321 NJX INTERLAMINAR CRV/THRC: CPT

## 2023-09-12 ENCOUNTER — HOSPITAL ENCOUNTER (OUTPATIENT)
Age: 53
End: 2023-09-12
Payer: COMMERCIAL

## 2023-09-12 DIAGNOSIS — J18.9: ICD-10-CM

## 2023-09-12 DIAGNOSIS — D72.829: Primary | ICD-10-CM

## 2023-09-12 DIAGNOSIS — D75.839: ICD-10-CM

## 2023-09-12 LAB
ANION GAP SERPL CALC-SCNC: 14.4 MEQ/L (ref 5–15)
BUN SERPL-MCNC: 15 MG/DL (ref 7–17)
CALCIUM SPEC-MCNC: 9.3 MG/DL (ref 8.4–10.2)
CELLS COUNTED: 100
CHLORIDE SPEC-SCNC: 99 MMOL/L (ref 98–107)
CO2 SERPL-SCNC: 30 MMOL/L (ref 22–30)
CREAT BLD-SCNC: 0.7 MG/DL (ref 0.52–1.04)
ESTIMATED GLOMERULAR FILT RATE: 88 ML/MIN (ref 60–?)
GFR (AFRICAN AMERICAN): 107 ML/MIN (ref 60–?)
GLUCOSE: 95 MG/DL (ref 74–100)
HCT VFR BLD CALC: 44.6 % (ref 37–47)
HGB BLD-MCNC: 14 G/DL (ref 12.2–16.2)
MANUAL DIFFERENTIAL: (no result)
MCHC RBC-ENTMCNC: 31.3 G/DL (ref 31.8–35.4)
MCV RBC: 89.5 FL (ref 81–99)
MEAN CORPUSCULAR HEMOGLOBIN: 28 PG (ref 27–31.2)
PLATELET # BLD: 364 K/MM3 (ref 142–424)
POTASSIUM: 4.4 MMOL/L (ref 3.5–5.1)
RBC # BLD AUTO: 4.99 M/MM3 (ref 4.2–5.4)
SODIUM SPEC-SCNC: 139 MMOL/L (ref 136–145)
WBC # BLD AUTO: 15 K/MM3 (ref 4.8–10.8)

## 2023-09-12 PROCEDURE — 85025 COMPLETE CBC W/AUTO DIFF WBC: CPT

## 2023-09-12 PROCEDURE — 80048 BASIC METABOLIC PNL TOTAL CA: CPT

## 2023-09-12 PROCEDURE — 71046 X-RAY EXAM CHEST 2 VIEWS: CPT

## 2023-09-12 PROCEDURE — 36415 COLL VENOUS BLD VENIPUNCTURE: CPT

## 2023-09-12 PROCEDURE — 85007 BL SMEAR W/DIFF WBC COUNT: CPT

## 2023-09-20 ENCOUNTER — HOSPITAL ENCOUNTER (OUTPATIENT)
Age: 53
Discharge: HOME | End: 2023-09-20
Payer: SELF-PAY

## 2023-09-20 VITALS
HEART RATE: 112 BPM | OXYGEN SATURATION: 95 % | DIASTOLIC BLOOD PRESSURE: 91 MMHG | SYSTOLIC BLOOD PRESSURE: 159 MMHG | RESPIRATION RATE: 18 BRPM

## 2023-09-20 VITALS — BODY MASS INDEX: 36 KG/M2

## 2023-09-20 DIAGNOSIS — M51.16: ICD-10-CM

## 2023-09-20 DIAGNOSIS — M48.02: ICD-10-CM

## 2023-09-20 DIAGNOSIS — M17.11: Primary | ICD-10-CM

## 2023-09-20 DIAGNOSIS — M50.10: ICD-10-CM

## 2023-09-20 PROCEDURE — 99212 OFFICE O/P EST SF 10 MIN: CPT

## 2023-09-20 PROCEDURE — G0463 HOSPITAL OUTPT CLINIC VISIT: HCPCS

## 2023-09-22 ENCOUNTER — HOSPITAL ENCOUNTER (OUTPATIENT)
Age: 53
Discharge: HOME | End: 2023-09-22
Payer: COMMERCIAL

## 2023-09-22 VITALS
SYSTOLIC BLOOD PRESSURE: 137 MMHG | DIASTOLIC BLOOD PRESSURE: 90 MMHG | HEART RATE: 81 BPM | TEMPERATURE: 98.06 F | RESPIRATION RATE: 16 BRPM | OXYGEN SATURATION: 99 %

## 2023-09-22 VITALS
RESPIRATION RATE: 16 BRPM | HEART RATE: 77 BPM | SYSTOLIC BLOOD PRESSURE: 152 MMHG | OXYGEN SATURATION: 99 % | DIASTOLIC BLOOD PRESSURE: 99 MMHG

## 2023-09-22 VITALS
RESPIRATION RATE: 18 BRPM | SYSTOLIC BLOOD PRESSURE: 164 MMHG | DIASTOLIC BLOOD PRESSURE: 88 MMHG | OXYGEN SATURATION: 97 % | HEART RATE: 69 BPM

## 2023-09-22 VITALS
RESPIRATION RATE: 18 BRPM | SYSTOLIC BLOOD PRESSURE: 164 MMHG | HEART RATE: 63 BPM | OXYGEN SATURATION: 97 % | DIASTOLIC BLOOD PRESSURE: 88 MMHG

## 2023-09-22 VITALS
RESPIRATION RATE: 16 BRPM | HEART RATE: 67 BPM | SYSTOLIC BLOOD PRESSURE: 142 MMHG | DIASTOLIC BLOOD PRESSURE: 100 MMHG | OXYGEN SATURATION: 99 %

## 2023-09-22 VITALS
DIASTOLIC BLOOD PRESSURE: 91 MMHG | SYSTOLIC BLOOD PRESSURE: 149 MMHG | HEART RATE: 71 BPM | RESPIRATION RATE: 16 BRPM | OXYGEN SATURATION: 98 %

## 2023-09-22 VITALS
HEART RATE: 67 BPM | DIASTOLIC BLOOD PRESSURE: 98 MMHG | RESPIRATION RATE: 16 BRPM | OXYGEN SATURATION: 99 % | SYSTOLIC BLOOD PRESSURE: 142 MMHG

## 2023-09-22 VITALS — BODY MASS INDEX: 36 KG/M2

## 2023-09-22 DIAGNOSIS — M96.1: Primary | ICD-10-CM

## 2023-09-22 DIAGNOSIS — M54.16: ICD-10-CM

## 2023-09-22 PROCEDURE — 62323 NJX INTERLAMINAR LMBR/SAC: CPT

## 2023-09-24 ENCOUNTER — HOSPITAL ENCOUNTER (EMERGENCY)
Age: 53
Discharge: ANOTHER HOSPITAL | End: 2023-09-24
Payer: SELF-PAY

## 2023-09-24 VITALS
SYSTOLIC BLOOD PRESSURE: 144 MMHG | RESPIRATION RATE: 15 BRPM | DIASTOLIC BLOOD PRESSURE: 75 MMHG | HEART RATE: 51 BPM | OXYGEN SATURATION: 99 % | TEMPERATURE: 97.88 F

## 2023-09-24 VITALS
DIASTOLIC BLOOD PRESSURE: 97 MMHG | RESPIRATION RATE: 18 BRPM | OXYGEN SATURATION: 100 % | SYSTOLIC BLOOD PRESSURE: 127 MMHG | HEART RATE: 53 BPM

## 2023-09-24 VITALS — SYSTOLIC BLOOD PRESSURE: 183 MMHG | OXYGEN SATURATION: 93 % | HEART RATE: 55 BPM | DIASTOLIC BLOOD PRESSURE: 69 MMHG

## 2023-09-24 VITALS
DIASTOLIC BLOOD PRESSURE: 49 MMHG | SYSTOLIC BLOOD PRESSURE: 95 MMHG | HEART RATE: 61 BPM | RESPIRATION RATE: 11 BRPM | OXYGEN SATURATION: 99 %

## 2023-09-24 VITALS
RESPIRATION RATE: 17 BRPM | OXYGEN SATURATION: 98 % | HEART RATE: 48 BPM | DIASTOLIC BLOOD PRESSURE: 75 MMHG | SYSTOLIC BLOOD PRESSURE: 144 MMHG

## 2023-09-24 VITALS
RESPIRATION RATE: 13 BRPM | OXYGEN SATURATION: 98 % | DIASTOLIC BLOOD PRESSURE: 85 MMHG | SYSTOLIC BLOOD PRESSURE: 166 MMHG | HEART RATE: 47 BPM

## 2023-09-24 VITALS
DIASTOLIC BLOOD PRESSURE: 90 MMHG | SYSTOLIC BLOOD PRESSURE: 173 MMHG | HEART RATE: 52 BPM | RESPIRATION RATE: 14 BRPM | OXYGEN SATURATION: 91 % | TEMPERATURE: 97.4 F

## 2023-09-24 VITALS
OXYGEN SATURATION: 97 % | SYSTOLIC BLOOD PRESSURE: 187 MMHG | HEART RATE: 51 BPM | RESPIRATION RATE: 14 BRPM | DIASTOLIC BLOOD PRESSURE: 90 MMHG

## 2023-09-24 VITALS — BODY MASS INDEX: 36 KG/M2

## 2023-09-24 DIAGNOSIS — R51.9: Primary | ICD-10-CM

## 2023-09-24 DIAGNOSIS — E11.9: ICD-10-CM

## 2023-09-24 DIAGNOSIS — R00.1: ICD-10-CM

## 2023-09-24 DIAGNOSIS — R53.1: ICD-10-CM

## 2023-09-24 DIAGNOSIS — R90.0: ICD-10-CM

## 2023-09-24 DIAGNOSIS — I20.9: ICD-10-CM

## 2023-09-24 DIAGNOSIS — G47.33: ICD-10-CM

## 2023-09-24 DIAGNOSIS — I10: ICD-10-CM

## 2023-09-24 DIAGNOSIS — M54.2: ICD-10-CM

## 2023-09-24 LAB
ALBUMIN LEVEL: 3.8 G/DL (ref 3.5–5)
ALBUMIN/GLOB SERPL: 1.3 {RATIO} (ref 1.1–1.8)
ALP ISO SERPL-ACNC: 174 U/L (ref 38–126)
ALT SERPLBLD-CCNC: 30 U/L (ref 12–78)
ANION GAP SERPL CALC-SCNC: 13.2 MEQ/L (ref 5–15)
AST SERPL QL: 23 U/L (ref 14–36)
BILIRUBIN,TOTAL: 0.2 MG/DL (ref 0.2–1.3)
BUN SERPL-MCNC: 16 MG/DL (ref 7–17)
CALCIUM SPEC-MCNC: 8.9 MG/DL (ref 8.4–10.2)
CELLS COUNTED: 100
CHLORIDE SPEC-SCNC: 104 MMOL/L (ref 98–107)
CO2 SERPL-SCNC: 26 MMOL/L (ref 22–30)
CREAT BLD-SCNC: 0.6 MG/DL (ref 0.52–1.04)
CREATININE CLEARANCE ESTIMATED: 183 ML/MIN (ref 50–200)
ESTIMATED GLOMERULAR FILT RATE: 105 ML/MIN (ref 60–?)
GFR (AFRICAN AMERICAN): 128 ML/MIN (ref 60–?)
GLOBULIN SER CALC-MCNC: 3 G/DL (ref 1.3–3.2)
GLUCOSE BLDC GLUCOMTR-MCNC: 197 MG/DL (ref 70–110)
GLUCOSE: 232 MG/DL (ref 74–100)
HCT VFR BLD CALC: 43.6 % (ref 37–47)
HGB BLD-MCNC: 13.5 G/DL (ref 12.2–16.2)
INR PPP: 0.98 (ref 0.9–1.1)
MANUAL DIFFERENTIAL: (no result)
MCHC RBC-ENTMCNC: 31 G/DL (ref 31.8–35.4)
MCV RBC: 90.4 FL (ref 81–99)
MEAN CORPUSCULAR HEMOGLOBIN: 28 PG (ref 27–31.2)
PLATELET # BLD: 448 K/MM3 (ref 142–424)
POTASSIUM: 3.2 MMOL/L (ref 3.5–5.1)
PROT SERPL-MCNC: 6.8 G/DL (ref 6.3–8.2)
PT BLD: 10.6 SECONDS (ref 10.1–12.5)
RBC # BLD AUTO: 4.82 M/MM3 (ref 4.2–5.4)
SODIUM SPEC-SCNC: 140 MMOL/L (ref 136–145)
TROPONIN I: < 0.01 NG/ML (ref 0–0.03)
WBC # BLD AUTO: 15 K/MM3 (ref 4.8–10.8)

## 2023-09-24 PROCEDURE — 85007 BL SMEAR W/DIFF WBC COUNT: CPT

## 2023-09-24 PROCEDURE — 93005 ELECTROCARDIOGRAM TRACING: CPT

## 2023-09-24 PROCEDURE — 84484 ASSAY OF TROPONIN QUANT: CPT

## 2023-09-24 PROCEDURE — 99285 EMERGENCY DEPT VISIT HI MDM: CPT

## 2023-09-24 PROCEDURE — 96374 THER/PROPH/DIAG INJ IV PUSH: CPT

## 2023-09-24 PROCEDURE — 80053 COMPREHEN METABOLIC PANEL: CPT

## 2023-09-24 PROCEDURE — 96361 HYDRATE IV INFUSION ADD-ON: CPT

## 2023-09-24 PROCEDURE — 70498 CT ANGIOGRAPHY NECK: CPT

## 2023-09-24 PROCEDURE — 70496 CT ANGIOGRAPHY HEAD: CPT

## 2023-09-24 PROCEDURE — 85610 PROTHROMBIN TIME: CPT

## 2023-09-24 PROCEDURE — 85025 COMPLETE CBC W/AUTO DIFF WBC: CPT

## 2023-09-24 PROCEDURE — 82962 GLUCOSE BLOOD TEST: CPT

## 2023-09-24 PROCEDURE — 70450 CT HEAD/BRAIN W/O DYE: CPT

## 2023-09-29 ENCOUNTER — HOSPITAL ENCOUNTER (OUTPATIENT)
Age: 53
Discharge: HOME | End: 2023-09-29
Payer: COMMERCIAL

## 2023-09-29 VITALS
RESPIRATION RATE: 18 BRPM | DIASTOLIC BLOOD PRESSURE: 82 MMHG | HEART RATE: 109 BPM | OXYGEN SATURATION: 95 % | SYSTOLIC BLOOD PRESSURE: 109 MMHG

## 2023-09-29 VITALS — BODY MASS INDEX: 35.9 KG/M2

## 2023-09-29 DIAGNOSIS — M96.1: ICD-10-CM

## 2023-09-29 DIAGNOSIS — M51.16: Primary | ICD-10-CM

## 2023-09-29 PROCEDURE — 99212 OFFICE O/P EST SF 10 MIN: CPT

## 2023-09-29 PROCEDURE — G0463 HOSPITAL OUTPT CLINIC VISIT: HCPCS

## 2023-10-05 ENCOUNTER — HOSPITAL ENCOUNTER (OUTPATIENT)
Age: 53
Discharge: HOME | End: 2023-10-05
Payer: COMMERCIAL

## 2023-10-05 VITALS
HEART RATE: 94 BPM | RESPIRATION RATE: 18 BRPM | OXYGEN SATURATION: 99 % | DIASTOLIC BLOOD PRESSURE: 81 MMHG | SYSTOLIC BLOOD PRESSURE: 135 MMHG

## 2023-10-05 VITALS — BODY MASS INDEX: 37.3 KG/M2

## 2023-10-05 DIAGNOSIS — M48.02: ICD-10-CM

## 2023-10-05 DIAGNOSIS — M50.10: Primary | ICD-10-CM

## 2023-10-05 DIAGNOSIS — R51.9: ICD-10-CM

## 2023-10-05 DIAGNOSIS — M51.16: ICD-10-CM

## 2023-10-05 DIAGNOSIS — M79.10: ICD-10-CM

## 2023-10-05 PROCEDURE — G0463 HOSPITAL OUTPT CLINIC VISIT: HCPCS

## 2023-10-05 PROCEDURE — 99212 OFFICE O/P EST SF 10 MIN: CPT

## 2023-11-08 ENCOUNTER — HOSPITAL ENCOUNTER (OUTPATIENT)
Dept: HOSPITAL 22 - RT | Age: 53
End: 2023-11-08
Payer: COMMERCIAL

## 2023-11-08 DIAGNOSIS — R60.9: ICD-10-CM

## 2023-11-08 DIAGNOSIS — R06.02: ICD-10-CM

## 2023-11-08 DIAGNOSIS — R06.00: ICD-10-CM

## 2023-11-08 DIAGNOSIS — R00.1: ICD-10-CM

## 2023-11-08 DIAGNOSIS — R94.31: ICD-10-CM

## 2023-11-08 DIAGNOSIS — I10: Primary | ICD-10-CM

## 2023-11-08 DIAGNOSIS — R42: ICD-10-CM

## 2023-11-08 DIAGNOSIS — G47.33: ICD-10-CM

## 2023-11-08 DIAGNOSIS — Z86.73: ICD-10-CM

## 2023-11-08 LAB
ALBUMIN LEVEL: 3.9 G/DL (ref 3.5–5)
ALP ISO SERPL-ACNC: 134 U/L (ref 38–126)
ALT SERPLBLD-CCNC: 44 U/L (ref 12–78)
ANION GAP SERPL CALC-SCNC: 12 MEQ/L (ref 5–15)
AST SERPL QL: 31 U/L (ref 14–36)
BILIRUB DIRECT SERPL-MCNC: 0.1 MG/DL (ref 0–0.4)
BILIRUB INDIRECT SERPL-MCNC: 0 MG/DL (ref 0–0.9)
BILIRUB INDIRECT SERPL-MCNC: 0 MG/DL (ref 0–1.1)
BILIRUBIN,TOTAL: < 0.1 MG/DL (ref 0.2–1.3)
BUN SERPL-MCNC: 12 MG/DL (ref 7–17)
CALCIUM SPEC-MCNC: 9.3 MG/DL (ref 8.4–10.2)
CHLORIDE SPEC-SCNC: 105 MMOL/L (ref 98–107)
CHOLEST SPEC-SCNC: 183 MG/DL (ref 140–200)
CO2 SERPL-SCNC: 28 MMOL/L (ref 22–30)
CREAT BLD-SCNC: 0.7 MG/DL (ref 0.52–1.04)
ESTIMATED GLOMERULAR FILT RATE: 88 ML/MIN (ref 60–?)
GFR (AFRICAN AMERICAN): 107 ML/MIN (ref 60–?)
GLUCOSE: 117 MG/DL (ref 74–100)
HCT VFR BLD CALC: 37.5 % (ref 37–47)
HDLC SERPL-MCNC: 36 MG/DL (ref 40–60)
HGB BLD-MCNC: 12.5 G/DL (ref 12.2–16.2)
MAGNESIUM: 1.9 MG/DL (ref 1.6–2.3)
MCHC RBC-ENTMCNC: 33.3 G/DL (ref 31.8–35.4)
MCV RBC: 90.3 FL (ref 81–99)
MEAN CORPUSCULAR HEMOGLOBIN: 30.1 PG (ref 27–31.2)
PLATELET # BLD: 415 K/MM3 (ref 142–424)
POTASSIUM: 4 MMOL/L (ref 3.5–5.1)
PROT SERPL-MCNC: 6.5 G/DL (ref 6.3–8.2)
RBC # BLD AUTO: 4.15 M/MM3 (ref 4.2–5.4)
SODIUM SPEC-SCNC: 141 MMOL/L (ref 136–145)
T4 FREE SERPL-MCNC: 1.06 NG/DL (ref 0.78–2.19)
TRIGLYCERIDES: 189 MG/DL (ref 30–150)
TSH SERPL-ACNC: 3.05 UIU/ML (ref 0.47–4.68)
WBC # BLD AUTO: 12.4 K/MM3 (ref 4.8–10.8)

## 2023-11-08 PROCEDURE — 80048 BASIC METABOLIC PNL TOTAL CA: CPT

## 2023-11-08 PROCEDURE — 83735 ASSAY OF MAGNESIUM: CPT

## 2023-11-08 PROCEDURE — 93270 REMOTE 30 DAY ECG REV/REPORT: CPT

## 2023-11-08 PROCEDURE — 85025 COMPLETE CBC W/AUTO DIFF WBC: CPT

## 2023-11-08 PROCEDURE — 80076 HEPATIC FUNCTION PANEL: CPT

## 2023-11-08 PROCEDURE — 36415 COLL VENOUS BLD VENIPUNCTURE: CPT

## 2023-11-08 PROCEDURE — 84443 ASSAY THYROID STIM HORMONE: CPT

## 2023-11-08 PROCEDURE — 80061 LIPID PANEL: CPT

## 2023-11-08 PROCEDURE — 84439 ASSAY OF FREE THYROXINE: CPT

## 2023-12-30 ENCOUNTER — HOSPITAL ENCOUNTER (EMERGENCY)
Age: 53
Discharge: HOME | End: 2023-12-30
Payer: COMMERCIAL

## 2023-12-30 VITALS
RESPIRATION RATE: 20 BRPM | SYSTOLIC BLOOD PRESSURE: 156 MMHG | DIASTOLIC BLOOD PRESSURE: 92 MMHG | TEMPERATURE: 100.2 F | OXYGEN SATURATION: 98 % | HEART RATE: 119 BPM

## 2023-12-30 VITALS — BODY MASS INDEX: 38.2 KG/M2

## 2023-12-30 VITALS
SYSTOLIC BLOOD PRESSURE: 156 MMHG | DIASTOLIC BLOOD PRESSURE: 92 MMHG | TEMPERATURE: 100.22 F | HEART RATE: 119 BPM | OXYGEN SATURATION: 98 % | RESPIRATION RATE: 20 BRPM

## 2023-12-30 DIAGNOSIS — J01.90: ICD-10-CM

## 2023-12-30 DIAGNOSIS — R09.81: ICD-10-CM

## 2023-12-30 DIAGNOSIS — I11.9: ICD-10-CM

## 2023-12-30 DIAGNOSIS — R50.9: ICD-10-CM

## 2023-12-30 DIAGNOSIS — H66.93: ICD-10-CM

## 2023-12-30 DIAGNOSIS — R51.9: ICD-10-CM

## 2023-12-30 DIAGNOSIS — R05.9: ICD-10-CM

## 2023-12-30 DIAGNOSIS — U07.1: Primary | ICD-10-CM

## 2023-12-30 DIAGNOSIS — E78.5: ICD-10-CM

## 2023-12-30 DIAGNOSIS — I20.9: ICD-10-CM

## 2023-12-30 PROCEDURE — G0463 HOSPITAL OUTPT CLINIC VISIT: HCPCS

## 2023-12-30 PROCEDURE — 99212 OFFICE O/P EST SF 10 MIN: CPT

## 2023-12-30 PROCEDURE — 87804 INFLUENZA ASSAY W/OPTIC: CPT

## 2023-12-30 PROCEDURE — 99214 OFFICE O/P EST MOD 30 MIN: CPT

## 2023-12-30 PROCEDURE — 87635 SARS-COV-2 COVID-19 AMP PRB: CPT

## 2024-02-07 ENCOUNTER — HOSPITAL ENCOUNTER (OUTPATIENT)
Dept: HOSPITAL 22 - RT | Age: 54
End: 2024-02-07
Payer: COMMERCIAL

## 2024-02-07 DIAGNOSIS — I63.9: ICD-10-CM

## 2024-02-07 DIAGNOSIS — E78.5: ICD-10-CM

## 2024-02-07 DIAGNOSIS — R09.89: Primary | ICD-10-CM

## 2024-02-07 LAB
ALBUMIN LEVEL: 3.9 G/DL (ref 3.5–5)
ALP ISO SERPL-ACNC: 150 U/L (ref 38–126)
ALT SERPLBLD-CCNC: 21 U/L (ref 12–78)
AST SERPL QL: 19 U/L (ref 14–36)
BILIRUB DIRECT SERPL-MCNC: 0.1 MG/DL (ref 0–0.4)
BILIRUB INDIRECT SERPL-MCNC: 0 MG/DL (ref 0–0.9)
BILIRUB INDIRECT SERPL-MCNC: 0 MG/DL (ref 0–1.1)
BILIRUBIN,TOTAL: 0.1 MG/DL (ref 0.2–1.3)
CHOLEST SPEC-SCNC: 145 MG/DL (ref 140–200)
HDLC SERPL-MCNC: 41 MG/DL (ref 40–60)
PROT SERPL-MCNC: 6.8 G/DL (ref 6.3–8.2)
TRIGLYCERIDES: 112 MG/DL (ref 30–150)

## 2024-02-07 PROCEDURE — 80076 HEPATIC FUNCTION PANEL: CPT

## 2024-02-07 PROCEDURE — 93923 UPR/LXTR ART STDY 3+ LVLS: CPT

## 2024-02-07 PROCEDURE — 80061 LIPID PANEL: CPT

## 2024-02-29 ENCOUNTER — HOSPITAL ENCOUNTER (OUTPATIENT)
Dept: HOSPITAL 22 - RAD | Age: 54
End: 2024-02-29
Payer: COMMERCIAL

## 2024-02-29 DIAGNOSIS — I60.9: Primary | ICD-10-CM

## 2024-02-29 DIAGNOSIS — I63.81: ICD-10-CM

## 2024-02-29 LAB
BUN SERPL-MCNC: 15 MG/DL (ref 7–17)
CREATININE,SERUM: 1.5 MG/DL (ref 0.52–1.04)
ESTIMATED GLOMERULAR FILT RATE: 36 ML/MIN (ref 60–?)
GFR (AFRICAN AMERICAN): 44 ML/MIN (ref 60–?)

## 2024-02-29 PROCEDURE — 84520 ASSAY OF UREA NITROGEN: CPT

## 2024-02-29 PROCEDURE — 82565 ASSAY OF CREATININE: CPT

## 2024-02-29 PROCEDURE — 36415 COLL VENOUS BLD VENIPUNCTURE: CPT

## 2024-02-29 PROCEDURE — 70553 MRI BRAIN STEM W/O & W/DYE: CPT

## 2024-02-29 PROCEDURE — A9576 INJ PROHANCE MULTIPACK: HCPCS

## 2024-02-29 RX ADMIN — SODIUM CHLORIDE, PRESERVATIVE FREE 10 ML: 5 INJECTION INTRAVENOUS at 18:14

## 2024-02-29 RX ADMIN — GADOTERIDOL 22 ML: 279.3 INJECTION, SOLUTION INTRAVENOUS at 18:14

## 2024-03-01 ENCOUNTER — HOSPITAL ENCOUNTER (OUTPATIENT)
Age: 54
End: 2024-03-01
Payer: COMMERCIAL

## 2024-03-01 DIAGNOSIS — R06.02: Primary | ICD-10-CM

## 2024-03-01 DIAGNOSIS — Z12.31: ICD-10-CM

## 2024-03-01 PROCEDURE — 94729 DIFFUSING CAPACITY: CPT

## 2024-03-01 PROCEDURE — 77063 BREAST TOMOSYNTHESIS BI: CPT

## 2024-03-01 PROCEDURE — 77067 SCR MAMMO BI INCL CAD: CPT

## 2024-03-01 PROCEDURE — 94726 PLETHYSMOGRAPHY LUNG VOLUMES: CPT

## 2024-03-01 PROCEDURE — 94060 EVALUATION OF WHEEZING: CPT

## 2024-03-21 ENCOUNTER — HOSPITAL ENCOUNTER (OUTPATIENT)
Age: 54
Discharge: HOME | End: 2024-03-21
Payer: COMMERCIAL

## 2024-03-21 VITALS — BODY MASS INDEX: 37.5 KG/M2

## 2024-03-21 VITALS
RESPIRATION RATE: 20 BRPM | OXYGEN SATURATION: 96 % | SYSTOLIC BLOOD PRESSURE: 116 MMHG | HEART RATE: 87 BPM | DIASTOLIC BLOOD PRESSURE: 76 MMHG

## 2024-03-21 DIAGNOSIS — M79.10: ICD-10-CM

## 2024-03-21 DIAGNOSIS — M48.02: ICD-10-CM

## 2024-03-21 DIAGNOSIS — M50.123: ICD-10-CM

## 2024-03-21 DIAGNOSIS — M51.16: Primary | ICD-10-CM

## 2024-03-21 DIAGNOSIS — R51.9: ICD-10-CM

## 2024-03-21 PROCEDURE — G0463 HOSPITAL OUTPT CLINIC VISIT: HCPCS

## 2024-03-21 PROCEDURE — 99212 OFFICE O/P EST SF 10 MIN: CPT

## 2024-03-25 ENCOUNTER — HOSPITAL ENCOUNTER (EMERGENCY)
Age: 54
Discharge: HOME | End: 2024-03-25
Payer: COMMERCIAL

## 2024-03-25 VITALS
OXYGEN SATURATION: 96 % | DIASTOLIC BLOOD PRESSURE: 81 MMHG | RESPIRATION RATE: 19 BRPM | HEART RATE: 93 BPM | TEMPERATURE: 98.06 F | SYSTOLIC BLOOD PRESSURE: 121 MMHG

## 2024-03-25 VITALS
OXYGEN SATURATION: 96 % | SYSTOLIC BLOOD PRESSURE: 121 MMHG | HEART RATE: 93 BPM | TEMPERATURE: 98.1 F | RESPIRATION RATE: 19 BRPM | DIASTOLIC BLOOD PRESSURE: 81 MMHG

## 2024-03-25 VITALS — BODY MASS INDEX: 40.4 KG/M2

## 2024-03-25 DIAGNOSIS — E78.5: ICD-10-CM

## 2024-03-25 DIAGNOSIS — R06.2: ICD-10-CM

## 2024-03-25 DIAGNOSIS — R05.9: ICD-10-CM

## 2024-03-25 DIAGNOSIS — J20.9: Primary | ICD-10-CM

## 2024-03-25 DIAGNOSIS — F17.210: ICD-10-CM

## 2024-03-25 PROCEDURE — 99212 OFFICE O/P EST SF 10 MIN: CPT

## 2024-03-25 PROCEDURE — G0463 HOSPITAL OUTPT CLINIC VISIT: HCPCS

## 2024-03-25 PROCEDURE — 99214 OFFICE O/P EST MOD 30 MIN: CPT

## 2024-04-17 ENCOUNTER — HOSPITAL ENCOUNTER (OUTPATIENT)
Age: 54
Discharge: HOME | End: 2024-04-17
Payer: COMMERCIAL

## 2024-04-17 VITALS
RESPIRATION RATE: 16 BRPM | HEART RATE: 84 BPM | SYSTOLIC BLOOD PRESSURE: 123 MMHG | OXYGEN SATURATION: 94 % | DIASTOLIC BLOOD PRESSURE: 66 MMHG | TEMPERATURE: 97.88 F

## 2024-04-17 VITALS
DIASTOLIC BLOOD PRESSURE: 80 MMHG | RESPIRATION RATE: 14 BRPM | HEART RATE: 101 BPM | OXYGEN SATURATION: 92 % | SYSTOLIC BLOOD PRESSURE: 114 MMHG | TEMPERATURE: 97.5 F

## 2024-04-17 VITALS
OXYGEN SATURATION: 92 % | DIASTOLIC BLOOD PRESSURE: 62 MMHG | RESPIRATION RATE: 16 BRPM | SYSTOLIC BLOOD PRESSURE: 109 MMHG | HEART RATE: 89 BPM

## 2024-04-17 VITALS
RESPIRATION RATE: 18 BRPM | HEART RATE: 72 BPM | SYSTOLIC BLOOD PRESSURE: 99 MMHG | DIASTOLIC BLOOD PRESSURE: 60 MMHG | OXYGEN SATURATION: 95 % | TEMPERATURE: 97.4 F

## 2024-04-17 VITALS
HEART RATE: 94 BPM | SYSTOLIC BLOOD PRESSURE: 116 MMHG | DIASTOLIC BLOOD PRESSURE: 83 MMHG | RESPIRATION RATE: 16 BRPM | OXYGEN SATURATION: 92 %

## 2024-04-17 VITALS — OXYGEN SATURATION: 99 %

## 2024-04-17 VITALS — BODY MASS INDEX: 38 KG/M2

## 2024-04-17 DIAGNOSIS — Q21.12: ICD-10-CM

## 2024-04-17 DIAGNOSIS — R06.02: ICD-10-CM

## 2024-04-17 DIAGNOSIS — R94.31: Primary | ICD-10-CM

## 2024-04-17 DIAGNOSIS — G47.33: ICD-10-CM

## 2024-04-17 DIAGNOSIS — I10: ICD-10-CM

## 2024-04-17 DIAGNOSIS — Z79.899: ICD-10-CM

## 2024-04-17 DIAGNOSIS — Z79.84: ICD-10-CM

## 2024-04-17 DIAGNOSIS — E11.9: ICD-10-CM

## 2024-04-17 DIAGNOSIS — E78.5: ICD-10-CM

## 2024-04-17 DIAGNOSIS — Z86.73: ICD-10-CM

## 2024-04-17 LAB
ANION GAP SERPL CALC-SCNC: 8.8 MEQ/L (ref 5–15)
BUN SERPL-MCNC: 12 MG/DL (ref 7–17)
CALCIUM SPEC-MCNC: 9.3 MG/DL (ref 8.4–10.2)
CHLORIDE SPEC-SCNC: 101 MMOL/L (ref 98–107)
CO2 SERPL-SCNC: 30 MMOL/L (ref 22–30)
CREATININE CLEARANCE ESTIMATED: 191 ML/MIN (ref 50–200)
CREATININE,SERUM: 0.6 MG/DL (ref 0.52–1.04)
ESTIMATED GLOMERULAR FILT RATE: 105 ML/MIN (ref 60–?)
GFR (AFRICAN AMERICAN): 127 ML/MIN (ref 60–?)
GLUCOSE BLDC GLUCOMTR-MCNC: 131 MG/DL (ref 70–110)
GLUCOSE: 132 MG/DL (ref 74–100)
HCT VFR BLD CALC: 41.4 % (ref 37–47)
HGB BLD-MCNC: 13.5 G/DL (ref 12.2–16.2)
INR PPP: 0.97 (ref 0.9–1.1)
MCHC RBC-ENTMCNC: 32.5 G/DL (ref 31.8–35.4)
MCV RBC: 89.3 FL (ref 81–99)
MEAN CORPUSCULAR HEMOGLOBIN: 29 PG (ref 27–31.2)
PLATELET # BLD: 388 K/MM3 (ref 142–424)
POTASSIUM: 3.8 MMOL/L (ref 3.5–5.1)
PT BLD: 10.5 SECONDS (ref 10.1–12.5)
RBC # BLD AUTO: 4.64 M/MM3 (ref 4.2–5.4)
SODIUM SPEC-SCNC: 136 MMOL/L (ref 136–145)
WBC # BLD AUTO: 10.6 K/MM3 (ref 4.8–10.8)

## 2024-04-17 PROCEDURE — 85610 PROTHROMBIN TIME: CPT

## 2024-04-17 PROCEDURE — 93319 3D ECHO IMG CGEN CAR ANOMAL: CPT

## 2024-04-17 PROCEDURE — 82962 GLUCOSE BLOOD TEST: CPT

## 2024-04-17 PROCEDURE — 80048 BASIC METABOLIC PNL TOTAL CA: CPT

## 2024-04-17 PROCEDURE — 93312 ECHO TRANSESOPHAGEAL: CPT

## 2024-04-17 PROCEDURE — 93270 REMOTE 30 DAY ECG REV/REPORT: CPT

## 2024-04-17 PROCEDURE — 85025 COMPLETE CBC W/AUTO DIFF WBC: CPT

## 2024-04-17 PROCEDURE — 93005 ELECTROCARDIOGRAM TRACING: CPT

## 2024-04-23 ENCOUNTER — HOSPITAL ENCOUNTER (OUTPATIENT)
Age: 54
Discharge: HOME | End: 2024-04-23
Payer: COMMERCIAL

## 2024-04-23 VITALS
OXYGEN SATURATION: 97 % | SYSTOLIC BLOOD PRESSURE: 143 MMHG | HEART RATE: 84 BPM | RESPIRATION RATE: 16 BRPM | DIASTOLIC BLOOD PRESSURE: 79 MMHG

## 2024-04-23 VITALS
RESPIRATION RATE: 20 BRPM | HEART RATE: 70 BPM | DIASTOLIC BLOOD PRESSURE: 95 MMHG | OXYGEN SATURATION: 95 % | SYSTOLIC BLOOD PRESSURE: 139 MMHG

## 2024-04-23 VITALS
DIASTOLIC BLOOD PRESSURE: 95 MMHG | RESPIRATION RATE: 20 BRPM | HEART RATE: 69 BPM | SYSTOLIC BLOOD PRESSURE: 139 MMHG | OXYGEN SATURATION: 95 %

## 2024-04-23 VITALS
TEMPERATURE: 98.1 F | SYSTOLIC BLOOD PRESSURE: 121 MMHG | OXYGEN SATURATION: 97 % | DIASTOLIC BLOOD PRESSURE: 70 MMHG | HEART RATE: 84 BPM | RESPIRATION RATE: 16 BRPM

## 2024-04-23 VITALS — BODY MASS INDEX: 38 KG/M2

## 2024-04-23 DIAGNOSIS — M50.123: Primary | ICD-10-CM

## 2024-04-23 PROCEDURE — 62321 NJX INTERLAMINAR CRV/THRC: CPT

## 2024-04-24 ENCOUNTER — HOSPITAL ENCOUNTER (OUTPATIENT)
Dept: HOSPITAL 22 - RT | Age: 54
Discharge: HOME | End: 2024-04-24
Payer: COMMERCIAL

## 2024-04-24 DIAGNOSIS — G47.10: ICD-10-CM

## 2024-04-24 DIAGNOSIS — G47.33: Primary | ICD-10-CM

## 2024-04-24 PROCEDURE — 94762 N-INVAS EAR/PLS OXIMTRY CONT: CPT

## 2024-04-26 ENCOUNTER — HOSPITAL ENCOUNTER (OUTPATIENT)
Dept: HOSPITAL 22 - RT | Age: 54
Discharge: HOME | End: 2024-04-26
Payer: COMMERCIAL

## 2024-04-26 DIAGNOSIS — I63.9: Primary | ICD-10-CM

## 2024-04-26 PROCEDURE — 93270 REMOTE 30 DAY ECG REV/REPORT: CPT

## 2024-05-06 ENCOUNTER — HOSPITAL ENCOUNTER (OUTPATIENT)
Dept: HOSPITAL 22 - RAD | Age: 54
Discharge: HOME | End: 2024-05-06
Payer: COMMERCIAL

## 2024-05-06 ENCOUNTER — HOSPITAL ENCOUNTER (OUTPATIENT)
Age: 54
Discharge: HOME | End: 2024-05-06
Payer: COMMERCIAL

## 2024-05-06 VITALS
SYSTOLIC BLOOD PRESSURE: 144 MMHG | RESPIRATION RATE: 18 BRPM | HEART RATE: 85 BPM | OXYGEN SATURATION: 98 % | DIASTOLIC BLOOD PRESSURE: 72 MMHG

## 2024-05-06 VITALS — BODY MASS INDEX: 38 KG/M2

## 2024-05-06 DIAGNOSIS — M54.50: Primary | ICD-10-CM

## 2024-05-06 DIAGNOSIS — M51.16: Primary | ICD-10-CM

## 2024-05-06 DIAGNOSIS — M79.10: ICD-10-CM

## 2024-05-06 DIAGNOSIS — M47.896: ICD-10-CM

## 2024-05-06 DIAGNOSIS — M48.02: ICD-10-CM

## 2024-05-06 DIAGNOSIS — G89.29: ICD-10-CM

## 2024-05-06 DIAGNOSIS — M50.10: ICD-10-CM

## 2024-05-06 DIAGNOSIS — R51.9: ICD-10-CM

## 2024-05-06 PROCEDURE — 72110 X-RAY EXAM L-2 SPINE 4/>VWS: CPT

## 2024-05-06 PROCEDURE — 99212 OFFICE O/P EST SF 10 MIN: CPT

## 2024-05-06 PROCEDURE — G0463 HOSPITAL OUTPT CLINIC VISIT: HCPCS

## 2024-05-28 ENCOUNTER — HOSPITAL ENCOUNTER (OUTPATIENT)
Age: 54
Discharge: HOME | End: 2024-05-28
Payer: COMMERCIAL

## 2024-05-28 VITALS
SYSTOLIC BLOOD PRESSURE: 126 MMHG | OXYGEN SATURATION: 95 % | DIASTOLIC BLOOD PRESSURE: 79 MMHG | HEART RATE: 61 BPM | RESPIRATION RATE: 18 BRPM

## 2024-05-28 VITALS
RESPIRATION RATE: 16 BRPM | DIASTOLIC BLOOD PRESSURE: 67 MMHG | OXYGEN SATURATION: 97 % | HEART RATE: 82 BPM | TEMPERATURE: 97.6 F | SYSTOLIC BLOOD PRESSURE: 114 MMHG

## 2024-05-28 VITALS
DIASTOLIC BLOOD PRESSURE: 91 MMHG | HEART RATE: 67 BPM | OXYGEN SATURATION: 98 % | RESPIRATION RATE: 18 BRPM | SYSTOLIC BLOOD PRESSURE: 141 MMHG

## 2024-05-28 VITALS
SYSTOLIC BLOOD PRESSURE: 126 MMHG | HEART RATE: 69 BPM | OXYGEN SATURATION: 95 % | RESPIRATION RATE: 18 BRPM | DIASTOLIC BLOOD PRESSURE: 79 MMHG

## 2024-05-28 VITALS — BODY MASS INDEX: 36.5 KG/M2

## 2024-05-28 DIAGNOSIS — M96.1: ICD-10-CM

## 2024-05-28 DIAGNOSIS — M51.16: ICD-10-CM

## 2024-05-28 DIAGNOSIS — M47.896: Primary | ICD-10-CM

## 2024-05-28 PROCEDURE — 64493 INJ PARAVERT F JNT L/S 1 LEV: CPT

## 2024-05-28 PROCEDURE — 64494 INJ PARAVERT F JNT L/S 2 LEV: CPT

## 2024-06-05 ENCOUNTER — HOSPITAL ENCOUNTER (OUTPATIENT)
Age: 54
Discharge: HOME | End: 2024-06-05
Payer: COMMERCIAL

## 2024-06-05 VITALS
RESPIRATION RATE: 16 BRPM | DIASTOLIC BLOOD PRESSURE: 87 MMHG | HEART RATE: 85 BPM | OXYGEN SATURATION: 97 % | SYSTOLIC BLOOD PRESSURE: 139 MMHG

## 2024-06-05 VITALS — BODY MASS INDEX: 36.5 KG/M2

## 2024-06-05 DIAGNOSIS — M79.10: ICD-10-CM

## 2024-06-05 DIAGNOSIS — R51.9: ICD-10-CM

## 2024-06-05 DIAGNOSIS — M50.10: ICD-10-CM

## 2024-06-05 DIAGNOSIS — M47.896: ICD-10-CM

## 2024-06-05 DIAGNOSIS — M48.02: ICD-10-CM

## 2024-06-05 DIAGNOSIS — M51.16: Primary | ICD-10-CM

## 2024-06-05 PROCEDURE — G0463 HOSPITAL OUTPT CLINIC VISIT: HCPCS

## 2024-06-05 PROCEDURE — 99212 OFFICE O/P EST SF 10 MIN: CPT

## 2024-06-25 ENCOUNTER — HOSPITAL ENCOUNTER (OUTPATIENT)
Dept: HOSPITAL 22 - SC.PAINP | Age: 54
Discharge: HOME | End: 2024-06-25
Payer: COMMERCIAL

## 2024-06-25 VITALS
HEART RATE: 76 BPM | SYSTOLIC BLOOD PRESSURE: 118 MMHG | OXYGEN SATURATION: 96 % | DIASTOLIC BLOOD PRESSURE: 81 MMHG | RESPIRATION RATE: 16 BRPM

## 2024-06-25 VITALS
TEMPERATURE: 97.7 F | OXYGEN SATURATION: 99 % | DIASTOLIC BLOOD PRESSURE: 79 MMHG | RESPIRATION RATE: 18 BRPM | HEART RATE: 85 BPM | SYSTOLIC BLOOD PRESSURE: 121 MMHG

## 2024-06-25 VITALS
RESPIRATION RATE: 18 BRPM | OXYGEN SATURATION: 95 % | SYSTOLIC BLOOD PRESSURE: 135 MMHG | HEART RATE: 71 BPM | DIASTOLIC BLOOD PRESSURE: 64 MMHG

## 2024-06-25 VITALS — BODY MASS INDEX: 37.9 KG/M2

## 2024-06-25 DIAGNOSIS — M47.816: Primary | ICD-10-CM

## 2024-06-25 PROCEDURE — 64493 INJ PARAVERT F JNT L/S 1 LEV: CPT

## 2024-06-25 PROCEDURE — 64494 INJ PARAVERT F JNT L/S 2 LEV: CPT

## 2024-06-25 RX ADMIN — METHYLPREDNISOLONE ACETATE 80 MG: 80 INJECTION, SUSPENSION INTRA-ARTICULAR; INTRALESIONAL; INTRAMUSCULAR; SOFT TISSUE at 10:32

## 2024-06-25 RX ADMIN — LIDOCAINE HYDROCHLORIDE 5 ML: 10 INJECTION, SOLUTION EPIDURAL; INFILTRATION; INTRACAUDAL; PERINEURAL at 10:32

## 2024-06-25 RX ADMIN — BUPIVACAINE HYDROCHLORIDE 25 MG: 2.5 INJECTION, SOLUTION EPIDURAL; INFILTRATION; INTRACAUDAL at 10:31

## 2024-07-02 ENCOUNTER — HOSPITAL ENCOUNTER (OUTPATIENT)
Age: 54
Discharge: HOME | End: 2024-07-02
Payer: COMMERCIAL

## 2024-07-02 VITALS
DIASTOLIC BLOOD PRESSURE: 74 MMHG | OXYGEN SATURATION: 98 % | HEART RATE: 66 BPM | RESPIRATION RATE: 19 BRPM | SYSTOLIC BLOOD PRESSURE: 125 MMHG

## 2024-07-02 VITALS — HEART RATE: 70 BPM

## 2024-07-02 VITALS
DIASTOLIC BLOOD PRESSURE: 87 MMHG | OXYGEN SATURATION: 97 % | HEART RATE: 71 BPM | SYSTOLIC BLOOD PRESSURE: 124 MMHG | RESPIRATION RATE: 19 BRPM

## 2024-07-02 VITALS — HEART RATE: 74 BPM

## 2024-07-02 VITALS — BODY MASS INDEX: 36.9 KG/M2

## 2024-07-02 DIAGNOSIS — R00.2: ICD-10-CM

## 2024-07-02 DIAGNOSIS — R94.31: ICD-10-CM

## 2024-07-02 DIAGNOSIS — R42: Primary | ICD-10-CM

## 2024-07-02 DIAGNOSIS — Z86.73: ICD-10-CM

## 2024-07-02 DIAGNOSIS — Q21.12: ICD-10-CM

## 2024-07-02 DIAGNOSIS — I10: ICD-10-CM

## 2024-07-02 DIAGNOSIS — R06.02: ICD-10-CM

## 2024-07-02 PROCEDURE — 33285 INSJ SUBQ CAR RHYTHM MNTR: CPT

## 2024-07-02 PROCEDURE — C1764 EVENT RECORDER, CARDIAC: HCPCS

## 2024-07-18 ENCOUNTER — HOSPITAL ENCOUNTER (OUTPATIENT)
Age: 54
Discharge: HOME | End: 2024-07-18
Payer: COMMERCIAL

## 2024-07-18 VITALS — BODY MASS INDEX: 36.8 KG/M2

## 2024-07-18 VITALS
OXYGEN SATURATION: 96 % | HEART RATE: 89 BPM | SYSTOLIC BLOOD PRESSURE: 132 MMHG | DIASTOLIC BLOOD PRESSURE: 75 MMHG | RESPIRATION RATE: 16 BRPM

## 2024-07-18 DIAGNOSIS — Z73.89: ICD-10-CM

## 2024-07-18 DIAGNOSIS — M47.816: Primary | ICD-10-CM

## 2024-07-18 DIAGNOSIS — Z79.899: ICD-10-CM

## 2024-07-18 DIAGNOSIS — Z96.7: ICD-10-CM

## 2024-07-18 DIAGNOSIS — Z95.811: ICD-10-CM

## 2024-07-18 PROCEDURE — G0463 HOSPITAL OUTPT CLINIC VISIT: HCPCS

## 2024-07-18 PROCEDURE — 99212 OFFICE O/P EST SF 10 MIN: CPT

## 2024-08-27 ENCOUNTER — HOSPITAL ENCOUNTER (OUTPATIENT)
Age: 54
Discharge: HOME | End: 2024-08-27
Payer: COMMERCIAL

## 2024-08-27 VITALS
OXYGEN SATURATION: 99 % | SYSTOLIC BLOOD PRESSURE: 138 MMHG | TEMPERATURE: 97.52 F | HEART RATE: 87 BPM | RESPIRATION RATE: 16 BRPM | DIASTOLIC BLOOD PRESSURE: 91 MMHG

## 2024-08-27 VITALS
DIASTOLIC BLOOD PRESSURE: 80 MMHG | RESPIRATION RATE: 16 BRPM | SYSTOLIC BLOOD PRESSURE: 129 MMHG | OXYGEN SATURATION: 99 % | HEART RATE: 72 BPM

## 2024-08-27 VITALS
OXYGEN SATURATION: 97 % | DIASTOLIC BLOOD PRESSURE: 61 MMHG | RESPIRATION RATE: 18 BRPM | HEART RATE: 76 BPM | SYSTOLIC BLOOD PRESSURE: 127 MMHG

## 2024-08-27 VITALS
HEART RATE: 76 BPM | SYSTOLIC BLOOD PRESSURE: 127 MMHG | DIASTOLIC BLOOD PRESSURE: 61 MMHG | RESPIRATION RATE: 18 BRPM | OXYGEN SATURATION: 97 %

## 2024-08-27 VITALS — BODY MASS INDEX: 35 KG/M2

## 2024-08-27 DIAGNOSIS — M51.16: ICD-10-CM

## 2024-08-27 DIAGNOSIS — M96.1: ICD-10-CM

## 2024-08-27 DIAGNOSIS — M47.816: Primary | ICD-10-CM

## 2024-08-27 DIAGNOSIS — M43.26: ICD-10-CM

## 2024-08-27 PROCEDURE — 64636 DESTROY L/S FACET JNT ADDL: CPT

## 2024-08-27 PROCEDURE — 64635 DESTROY LUMB/SAC FACET JNT: CPT

## 2024-09-19 ENCOUNTER — HOSPITAL ENCOUNTER (OUTPATIENT)
Age: 54
Discharge: HOME | End: 2024-09-19
Payer: COMMERCIAL

## 2024-09-19 VITALS
DIASTOLIC BLOOD PRESSURE: 88 MMHG | HEART RATE: 89 BPM | RESPIRATION RATE: 16 BRPM | SYSTOLIC BLOOD PRESSURE: 143 MMHG | OXYGEN SATURATION: 98 %

## 2024-09-19 VITALS — BODY MASS INDEX: 35 KG/M2

## 2024-09-19 DIAGNOSIS — Z79.899: ICD-10-CM

## 2024-09-19 DIAGNOSIS — Z73.89: ICD-10-CM

## 2024-09-19 DIAGNOSIS — Z86.73: ICD-10-CM

## 2024-09-19 DIAGNOSIS — M46.1: Primary | ICD-10-CM

## 2024-09-19 DIAGNOSIS — M79.18: ICD-10-CM

## 2024-09-19 PROCEDURE — 99212 OFFICE O/P EST SF 10 MIN: CPT

## 2024-09-19 PROCEDURE — G0463 HOSPITAL OUTPT CLINIC VISIT: HCPCS

## 2024-10-29 ENCOUNTER — HOSPITAL ENCOUNTER (OUTPATIENT)
Dept: HOSPITAL 22 - SC.PAINP | Age: 54
Discharge: HOME | End: 2024-10-29
Payer: COMMERCIAL

## 2024-10-29 VITALS
HEART RATE: 77 BPM | DIASTOLIC BLOOD PRESSURE: 72 MMHG | TEMPERATURE: 97.6 F | SYSTOLIC BLOOD PRESSURE: 114 MMHG | RESPIRATION RATE: 16 BRPM | OXYGEN SATURATION: 97 %

## 2024-10-29 VITALS
DIASTOLIC BLOOD PRESSURE: 78 MMHG | OXYGEN SATURATION: 98 % | SYSTOLIC BLOOD PRESSURE: 124 MMHG | HEART RATE: 76 BPM | RESPIRATION RATE: 16 BRPM

## 2024-10-29 VITALS
RESPIRATION RATE: 18 BRPM | OXYGEN SATURATION: 95 % | SYSTOLIC BLOOD PRESSURE: 128 MMHG | DIASTOLIC BLOOD PRESSURE: 83 MMHG | HEART RATE: 69 BPM

## 2024-10-29 VITALS
DIASTOLIC BLOOD PRESSURE: 83 MMHG | HEART RATE: 65 BPM | RESPIRATION RATE: 18 BRPM | OXYGEN SATURATION: 95 % | SYSTOLIC BLOOD PRESSURE: 128 MMHG

## 2024-10-29 VITALS — BODY MASS INDEX: 33.5 KG/M2

## 2024-10-29 DIAGNOSIS — M96.1: ICD-10-CM

## 2024-10-29 DIAGNOSIS — M46.1: ICD-10-CM

## 2024-10-29 DIAGNOSIS — M51.16: Primary | ICD-10-CM

## 2024-10-29 PROCEDURE — 27096 INJECT SACROILIAC JOINT: CPT

## 2024-10-29 PROCEDURE — G0260 INJ FOR SACROILIAC JT ANESTH: HCPCS

## 2024-10-29 RX ADMIN — METHYLPREDNISOLONE ACETATE 80 MG: 80 INJECTION, SUSPENSION INTRA-ARTICULAR; INTRALESIONAL; INTRAMUSCULAR; SOFT TISSUE at 09:11

## 2024-10-29 RX ADMIN — BUPIVACAINE HYDROCHLORIDE 25 MG: 2.5 INJECTION, SOLUTION EPIDURAL; INFILTRATION; INTRACAUDAL at 09:11

## 2024-10-29 RX ADMIN — LIDOCAINE HYDROCHLORIDE 5 ML: 10 INJECTION, SOLUTION EPIDURAL; INFILTRATION; INTRACAUDAL; PERINEURAL at 09:11

## 2024-11-13 ENCOUNTER — HOSPITAL ENCOUNTER (OUTPATIENT)
Age: 54
Discharge: HOME | End: 2024-11-13
Payer: COMMERCIAL

## 2024-11-13 VITALS
RESPIRATION RATE: 14 BRPM | SYSTOLIC BLOOD PRESSURE: 107 MMHG | HEART RATE: 87 BPM | DIASTOLIC BLOOD PRESSURE: 75 MMHG | OXYGEN SATURATION: 98 %

## 2024-11-13 VITALS — BODY MASS INDEX: 33.2 KG/M2

## 2024-11-13 DIAGNOSIS — Z79.899: ICD-10-CM

## 2024-11-13 DIAGNOSIS — Z73.89: ICD-10-CM

## 2024-11-13 DIAGNOSIS — M25.512: ICD-10-CM

## 2024-11-13 DIAGNOSIS — M25.511: ICD-10-CM

## 2024-11-13 DIAGNOSIS — M79.18: Primary | ICD-10-CM

## 2024-11-13 PROCEDURE — 99212 OFFICE O/P EST SF 10 MIN: CPT

## 2024-11-13 PROCEDURE — G0463 HOSPITAL OUTPT CLINIC VISIT: HCPCS

## 2024-11-27 ENCOUNTER — HOSPITAL ENCOUNTER (OUTPATIENT)
Dept: HOSPITAL 22 - LAB | Age: 54
Discharge: HOME | End: 2024-11-27
Payer: COMMERCIAL

## 2024-11-27 DIAGNOSIS — E53.8: ICD-10-CM

## 2024-11-27 DIAGNOSIS — E55.9: ICD-10-CM

## 2024-11-27 DIAGNOSIS — E78.5: Primary | ICD-10-CM

## 2024-11-27 DIAGNOSIS — E11.9: ICD-10-CM

## 2024-11-27 DIAGNOSIS — Z79.85: ICD-10-CM

## 2024-11-27 LAB
25-OH VITAMIN D, TOTAL: 39 NG/ML (ref 30–100)
ALBUMIN LEVEL: 3.7 G/DL (ref 3.5–5)
ALBUMIN/GLOB SERPL: 1.5 {RATIO} (ref 1.1–1.8)
ALP ISO SERPL-ACNC: 164 U/L (ref 38–126)
ALT SERPLBLD-CCNC: 26 U/L (ref 12–78)
ANION GAP SERPL CALC-SCNC: 9.1 MEQ/L (ref 5–15)
AST SERPL QL: 25 U/L (ref 14–36)
BILIRUBIN,TOTAL: 0.4 MG/DL (ref 0.2–1.3)
BUN SERPL-MCNC: 7 MG/DL (ref 7–17)
CALCIUM SPEC-MCNC: 9.3 MG/DL (ref 8.4–10.2)
CHLORIDE SPEC-SCNC: 102 MMOL/L (ref 98–107)
CHOLEST SPEC-SCNC: 130 MG/DL (ref 140–200)
CO2 SERPL-SCNC: 30 MMOL/L (ref 22–30)
CREAT BLD-SCNC: 0.6 MG/DL (ref 0.52–1.04)
ESTIMATED GLOMERULAR FILT RATE: 105 ML/MIN (ref 60–?)
GFR (AFRICAN AMERICAN): 127 ML/MIN (ref 60–?)
GLOBULIN SER CALC-MCNC: 2.4 G/DL (ref 1.3–3.2)
GLUCOSE: 93 MG/DL (ref 74–100)
HBA1C MFR BLD: 6.2 % (ref 4–6)
HCT VFR BLD CALC: 41.2 % (ref 37–47)
HDLC SERPL-MCNC: 39 MG/DL (ref 40–60)
HGB BLD-MCNC: 13.9 G/DL (ref 12.2–16.2)
MCHC RBC-ENTMCNC: 33.8 G/DL (ref 31.8–35.4)
MCV RBC: 88.7 FL (ref 81–99)
MEAN CORPUSCULAR HEMOGLOBIN: 30 PG (ref 27–31.2)
PLATELET # BLD: 383 K/MM3 (ref 142–424)
POTASSIUM: 4.1 MMOL/L (ref 3.5–5.1)
PROT SERPL-MCNC: 6.1 G/DL (ref 6.3–8.2)
RBC # BLD AUTO: 4.64 M/MM3 (ref 4.2–5.4)
SODIUM SPEC-SCNC: 137 MMOL/L (ref 136–145)
TRIGLYCERIDES: 98 MG/DL (ref 30–150)
VITAMIN B12: 775 PG/ML (ref 239–931)
WBC # BLD AUTO: 9.6 K/MM3 (ref 4.8–10.8)

## 2024-11-27 PROCEDURE — 82570 ASSAY OF URINE CREATININE: CPT

## 2024-11-27 PROCEDURE — 83036 HEMOGLOBIN GLYCOSYLATED A1C: CPT

## 2024-11-27 PROCEDURE — 80053 COMPREHEN METABOLIC PANEL: CPT

## 2024-11-27 PROCEDURE — 80061 LIPID PANEL: CPT

## 2024-11-27 PROCEDURE — 82607 VITAMIN B-12: CPT

## 2024-11-27 PROCEDURE — 85025 COMPLETE CBC W/AUTO DIFF WBC: CPT

## 2024-11-27 PROCEDURE — 82306 VITAMIN D 25 HYDROXY: CPT

## 2024-11-27 PROCEDURE — 82043 UR ALBUMIN QUANTITATIVE: CPT

## 2024-11-27 PROCEDURE — 36415 COLL VENOUS BLD VENIPUNCTURE: CPT

## 2024-12-03 ENCOUNTER — HOSPITAL ENCOUNTER (OUTPATIENT)
Age: 54
Discharge: HOME | End: 2024-12-03
Payer: COMMERCIAL

## 2024-12-03 VITALS
OXYGEN SATURATION: 96 % | HEART RATE: 100 BPM | TEMPERATURE: 97.88 F | SYSTOLIC BLOOD PRESSURE: 140 MMHG | RESPIRATION RATE: 20 BRPM | DIASTOLIC BLOOD PRESSURE: 82 MMHG

## 2024-12-03 VITALS
DIASTOLIC BLOOD PRESSURE: 73 MMHG | HEART RATE: 92 BPM | SYSTOLIC BLOOD PRESSURE: 130 MMHG | OXYGEN SATURATION: 96 % | RESPIRATION RATE: 18 BRPM | TEMPERATURE: 98.42 F

## 2024-12-03 VITALS — BODY MASS INDEX: 33.2 KG/M2

## 2024-12-03 VITALS
HEART RATE: 90 BPM | SYSTOLIC BLOOD PRESSURE: 134 MMHG | RESPIRATION RATE: 20 BRPM | DIASTOLIC BLOOD PRESSURE: 79 MMHG | OXYGEN SATURATION: 96 %

## 2024-12-03 DIAGNOSIS — T82.7XXA: ICD-10-CM

## 2024-12-03 DIAGNOSIS — Z45.09: Primary | ICD-10-CM

## 2024-12-03 DIAGNOSIS — Z82.49: ICD-10-CM

## 2024-12-03 DIAGNOSIS — R06.02: ICD-10-CM

## 2024-12-03 DIAGNOSIS — I25.10: ICD-10-CM

## 2024-12-03 DIAGNOSIS — E11.9: ICD-10-CM

## 2024-12-03 DIAGNOSIS — I10: ICD-10-CM

## 2024-12-03 DIAGNOSIS — Z79.899: ICD-10-CM

## 2024-12-03 PROCEDURE — 33286 RMVL SUBQ CAR RHYTHM MNTR: CPT

## 2024-12-06 ENCOUNTER — HOSPITAL ENCOUNTER (OUTPATIENT)
Age: 54
Discharge: HOME | End: 2024-12-06
Payer: COMMERCIAL

## 2024-12-06 VITALS
SYSTOLIC BLOOD PRESSURE: 142 MMHG | DIASTOLIC BLOOD PRESSURE: 90 MMHG | HEART RATE: 74 BPM | TEMPERATURE: 97.8 F | RESPIRATION RATE: 16 BRPM | OXYGEN SATURATION: 96 %

## 2024-12-06 VITALS
RESPIRATION RATE: 18 BRPM | DIASTOLIC BLOOD PRESSURE: 79 MMHG | OXYGEN SATURATION: 97 % | SYSTOLIC BLOOD PRESSURE: 139 MMHG | HEART RATE: 72 BPM

## 2024-12-06 VITALS
SYSTOLIC BLOOD PRESSURE: 139 MMHG | OXYGEN SATURATION: 97 % | DIASTOLIC BLOOD PRESSURE: 79 MMHG | HEART RATE: 72 BPM | RESPIRATION RATE: 18 BRPM

## 2024-12-06 VITALS
DIASTOLIC BLOOD PRESSURE: 78 MMHG | RESPIRATION RATE: 16 BRPM | OXYGEN SATURATION: 99 % | SYSTOLIC BLOOD PRESSURE: 130 MMHG | HEART RATE: 78 BPM

## 2024-12-06 VITALS — BODY MASS INDEX: 32.8 KG/M2

## 2024-12-06 DIAGNOSIS — M46.1: ICD-10-CM

## 2024-12-06 DIAGNOSIS — M79.18: Primary | ICD-10-CM

## 2024-12-06 DIAGNOSIS — Z73.89: ICD-10-CM

## 2024-12-06 DIAGNOSIS — M54.50: ICD-10-CM

## 2024-12-06 PROCEDURE — 99212 OFFICE O/P EST SF 10 MIN: CPT

## 2024-12-06 PROCEDURE — 20552 NJX 1/MLT TRIGGER POINT 1/2: CPT

## 2024-12-06 PROCEDURE — G0463 HOSPITAL OUTPT CLINIC VISIT: HCPCS

## 2024-12-18 ENCOUNTER — HOSPITAL ENCOUNTER (OUTPATIENT)
Age: 54
Discharge: HOME | End: 2024-12-18
Payer: COMMERCIAL

## 2024-12-18 VITALS
SYSTOLIC BLOOD PRESSURE: 120 MMHG | RESPIRATION RATE: 16 BRPM | DIASTOLIC BLOOD PRESSURE: 74 MMHG | HEART RATE: 88 BPM | OXYGEN SATURATION: 94 %

## 2024-12-18 VITALS — BODY MASS INDEX: 32.5 KG/M2

## 2024-12-18 DIAGNOSIS — M54.2: ICD-10-CM

## 2024-12-18 DIAGNOSIS — Z79.899: ICD-10-CM

## 2024-12-18 DIAGNOSIS — M47.812: Primary | ICD-10-CM

## 2024-12-18 DIAGNOSIS — Z96.643: ICD-10-CM

## 2024-12-18 DIAGNOSIS — Z73.89: ICD-10-CM

## 2024-12-18 PROCEDURE — 99212 OFFICE O/P EST SF 10 MIN: CPT

## 2024-12-18 PROCEDURE — G0463 HOSPITAL OUTPT CLINIC VISIT: HCPCS

## 2025-01-21 ENCOUNTER — HOSPITAL ENCOUNTER (OUTPATIENT)
Age: 55
Discharge: HOME | End: 2025-01-21
Payer: COMMERCIAL

## 2025-01-21 VITALS
HEART RATE: 74 BPM | SYSTOLIC BLOOD PRESSURE: 117 MMHG | RESPIRATION RATE: 18 BRPM | OXYGEN SATURATION: 95 % | DIASTOLIC BLOOD PRESSURE: 66 MMHG

## 2025-01-21 VITALS
HEART RATE: 74 BPM | OXYGEN SATURATION: 96 % | RESPIRATION RATE: 18 BRPM | SYSTOLIC BLOOD PRESSURE: 121 MMHG | DIASTOLIC BLOOD PRESSURE: 76 MMHG

## 2025-01-21 VITALS
HEART RATE: 68 BPM | DIASTOLIC BLOOD PRESSURE: 77 MMHG | RESPIRATION RATE: 18 BRPM | OXYGEN SATURATION: 97 % | SYSTOLIC BLOOD PRESSURE: 123 MMHG

## 2025-01-21 VITALS — BODY MASS INDEX: 32.2 KG/M2

## 2025-01-21 VITALS
OXYGEN SATURATION: 95 % | DIASTOLIC BLOOD PRESSURE: 66 MMHG | HEART RATE: 74 BPM | SYSTOLIC BLOOD PRESSURE: 117 MMHG | RESPIRATION RATE: 18 BRPM

## 2025-01-21 DIAGNOSIS — M70.61: Primary | ICD-10-CM

## 2025-01-21 DIAGNOSIS — M70.62: ICD-10-CM

## 2025-01-21 PROCEDURE — 77002 NEEDLE LOCALIZATION BY XRAY: CPT

## 2025-01-21 PROCEDURE — 20610 DRAIN/INJ JOINT/BURSA W/O US: CPT

## 2025-01-28 ENCOUNTER — HOSPITAL ENCOUNTER (OUTPATIENT)
Age: 55
Discharge: HOME | End: 2025-01-28
Payer: COMMERCIAL

## 2025-01-28 VITALS — BODY MASS INDEX: 31.3 KG/M2

## 2025-01-28 VITALS
DIASTOLIC BLOOD PRESSURE: 79 MMHG | RESPIRATION RATE: 18 BRPM | OXYGEN SATURATION: 97 % | SYSTOLIC BLOOD PRESSURE: 128 MMHG | HEART RATE: 81 BPM

## 2025-01-28 VITALS
OXYGEN SATURATION: 97 % | SYSTOLIC BLOOD PRESSURE: 105 MMHG | DIASTOLIC BLOOD PRESSURE: 59 MMHG | HEART RATE: 77 BPM | RESPIRATION RATE: 18 BRPM

## 2025-01-28 VITALS
HEART RATE: 77 BPM | OXYGEN SATURATION: 97 % | RESPIRATION RATE: 18 BRPM | SYSTOLIC BLOOD PRESSURE: 105 MMHG | DIASTOLIC BLOOD PRESSURE: 59 MMHG

## 2025-01-28 VITALS
OXYGEN SATURATION: 98 % | TEMPERATURE: 97.52 F | RESPIRATION RATE: 16 BRPM | DIASTOLIC BLOOD PRESSURE: 75 MMHG | HEART RATE: 72 BPM | SYSTOLIC BLOOD PRESSURE: 123 MMHG

## 2025-01-28 DIAGNOSIS — M50.30: ICD-10-CM

## 2025-01-28 DIAGNOSIS — M47.812: Primary | ICD-10-CM

## 2025-01-28 PROCEDURE — 64491 INJ PARAVERT F JNT C/T 2 LEV: CPT

## 2025-01-28 PROCEDURE — 64490 INJ PARAVERT F JNT C/T 1 LEV: CPT

## 2025-02-04 ENCOUNTER — HOSPITAL ENCOUNTER (OUTPATIENT)
Age: 55
Discharge: HOME | End: 2025-02-04
Payer: COMMERCIAL

## 2025-02-04 VITALS — BODY MASS INDEX: 31.3 KG/M2

## 2025-02-04 VITALS
OXYGEN SATURATION: 96 % | HEART RATE: 88 BPM | RESPIRATION RATE: 14 BRPM | SYSTOLIC BLOOD PRESSURE: 109 MMHG | DIASTOLIC BLOOD PRESSURE: 78 MMHG

## 2025-02-04 DIAGNOSIS — M79.18: ICD-10-CM

## 2025-02-04 DIAGNOSIS — M47.816: Primary | ICD-10-CM

## 2025-02-04 DIAGNOSIS — Z79.899: ICD-10-CM

## 2025-02-04 DIAGNOSIS — Z73.89: ICD-10-CM

## 2025-02-04 DIAGNOSIS — Z96.643: ICD-10-CM

## 2025-02-04 PROCEDURE — G0463 HOSPITAL OUTPT CLINIC VISIT: HCPCS

## 2025-02-04 PROCEDURE — 99212 OFFICE O/P EST SF 10 MIN: CPT

## 2025-03-04 ENCOUNTER — HOSPITAL ENCOUNTER (OUTPATIENT)
Dept: HOSPITAL 22 - RAD | Age: 55
Discharge: HOME | End: 2025-03-04
Payer: COMMERCIAL

## 2025-03-04 DIAGNOSIS — M79.89: Primary | ICD-10-CM

## 2025-03-04 DIAGNOSIS — Z87.891: ICD-10-CM

## 2025-03-04 PROCEDURE — 76642 ULTRASOUND BREAST LIMITED: CPT

## 2025-03-04 PROCEDURE — 71271 CT THORAX LUNG CANCER SCR C-: CPT

## 2025-03-07 ENCOUNTER — HOSPITAL ENCOUNTER (OUTPATIENT)
Age: 55
Discharge: HOME | End: 2025-03-07
Payer: COMMERCIAL

## 2025-03-07 VITALS
OXYGEN SATURATION: 98 % | HEART RATE: 100 BPM | SYSTOLIC BLOOD PRESSURE: 110 MMHG | DIASTOLIC BLOOD PRESSURE: 75 MMHG | RESPIRATION RATE: 18 BRPM

## 2025-03-07 VITALS — BODY MASS INDEX: 31.1 KG/M2

## 2025-03-07 DIAGNOSIS — Z96.643: ICD-10-CM

## 2025-03-07 DIAGNOSIS — M54.2: Primary | ICD-10-CM

## 2025-03-07 DIAGNOSIS — M47.22: ICD-10-CM

## 2025-03-07 DIAGNOSIS — Z73.89: ICD-10-CM

## 2025-03-07 DIAGNOSIS — M79.18: ICD-10-CM

## 2025-03-07 DIAGNOSIS — Z79.899: ICD-10-CM

## 2025-03-07 DIAGNOSIS — M47.816: ICD-10-CM

## 2025-03-07 DIAGNOSIS — M54.9: ICD-10-CM

## 2025-03-07 PROCEDURE — 99212 OFFICE O/P EST SF 10 MIN: CPT

## 2025-03-07 PROCEDURE — 72040 X-RAY EXAM NECK SPINE 2-3 VW: CPT

## 2025-03-07 PROCEDURE — G0463 HOSPITAL OUTPT CLINIC VISIT: HCPCS

## 2025-03-07 PROCEDURE — 72072 X-RAY EXAM THORAC SPINE 3VWS: CPT

## 2025-03-10 ENCOUNTER — HOSPITAL ENCOUNTER (OUTPATIENT)
Dept: HOSPITAL 22 - RAD | Age: 55
Discharge: HOME | End: 2025-03-10
Payer: COMMERCIAL

## 2025-03-10 DIAGNOSIS — Z12.31: Primary | ICD-10-CM

## 2025-03-10 PROCEDURE — 77067 SCR MAMMO BI INCL CAD: CPT

## 2025-03-10 PROCEDURE — 77063 BREAST TOMOSYNTHESIS BI: CPT

## 2025-03-11 ENCOUNTER — HOSPITAL ENCOUNTER (OUTPATIENT)
Dept: HOSPITAL 22 - LAB | Age: 55
Discharge: HOME | End: 2025-03-11
Payer: COMMERCIAL

## 2025-03-11 DIAGNOSIS — R25.1: ICD-10-CM

## 2025-03-11 DIAGNOSIS — M25.571: ICD-10-CM

## 2025-03-11 DIAGNOSIS — M79.671: Primary | ICD-10-CM

## 2025-03-11 LAB
TSH SERPL-ACNC: 1.47 UIU/ML (ref 0.47–4.68)
VITAMIN B12: 824 PG/ML (ref 239–931)

## 2025-03-11 PROCEDURE — 36415 COLL VENOUS BLD VENIPUNCTURE: CPT

## 2025-03-11 PROCEDURE — 73610 X-RAY EXAM OF ANKLE: CPT

## 2025-03-11 PROCEDURE — 73630 X-RAY EXAM OF FOOT: CPT

## 2025-03-11 PROCEDURE — 84443 ASSAY THYROID STIM HORMONE: CPT

## 2025-03-11 PROCEDURE — 82607 VITAMIN B-12: CPT

## 2025-03-27 ENCOUNTER — HOSPITAL ENCOUNTER (OUTPATIENT)
Age: 55
Discharge: HOME | End: 2025-03-27
Payer: COMMERCIAL

## 2025-03-27 VITALS
SYSTOLIC BLOOD PRESSURE: 118 MMHG | OXYGEN SATURATION: 100 % | RESPIRATION RATE: 14 BRPM | HEART RATE: 87 BPM | DIASTOLIC BLOOD PRESSURE: 69 MMHG

## 2025-03-27 VITALS — BODY MASS INDEX: 31 KG/M2

## 2025-03-27 DIAGNOSIS — Z79.899: ICD-10-CM

## 2025-03-27 DIAGNOSIS — Z96.643: ICD-10-CM

## 2025-03-27 DIAGNOSIS — Z73.89: ICD-10-CM

## 2025-03-27 DIAGNOSIS — M25.511: Primary | ICD-10-CM

## 2025-03-27 PROCEDURE — 99212 OFFICE O/P EST SF 10 MIN: CPT

## 2025-03-27 PROCEDURE — G0463 HOSPITAL OUTPT CLINIC VISIT: HCPCS

## 2025-04-08 ENCOUNTER — HOSPITAL ENCOUNTER (OUTPATIENT)
Dept: HOSPITAL 22 - SC.PAINP | Age: 55
Discharge: HOME | End: 2025-04-08
Payer: COMMERCIAL

## 2025-04-08 VITALS
HEART RATE: 66 BPM | RESPIRATION RATE: 18 BRPM | DIASTOLIC BLOOD PRESSURE: 79 MMHG | SYSTOLIC BLOOD PRESSURE: 122 MMHG | OXYGEN SATURATION: 94 %

## 2025-04-08 VITALS
HEART RATE: 63 BPM | OXYGEN SATURATION: 100 % | SYSTOLIC BLOOD PRESSURE: 119 MMHG | DIASTOLIC BLOOD PRESSURE: 77 MMHG | RESPIRATION RATE: 18 BRPM

## 2025-04-08 VITALS
OXYGEN SATURATION: 94 % | SYSTOLIC BLOOD PRESSURE: 122 MMHG | RESPIRATION RATE: 18 BRPM | DIASTOLIC BLOOD PRESSURE: 79 MMHG | HEART RATE: 66 BPM

## 2025-04-08 VITALS
SYSTOLIC BLOOD PRESSURE: 115 MMHG | DIASTOLIC BLOOD PRESSURE: 79 MMHG | OXYGEN SATURATION: 98 % | RESPIRATION RATE: 17 BRPM | HEART RATE: 89 BPM

## 2025-04-08 DIAGNOSIS — M50.30: Primary | ICD-10-CM

## 2025-04-08 DIAGNOSIS — M54.12: ICD-10-CM

## 2025-04-08 PROCEDURE — 62321 NJX INTERLAMINAR CRV/THRC: CPT

## 2025-04-08 RX ADMIN — METHYLPREDNISOLONE ACETATE 80 MG: 80 INJECTION, SUSPENSION INTRA-ARTICULAR; INTRALESIONAL; INTRAMUSCULAR; SOFT TISSUE at 10:29

## 2025-04-22 ENCOUNTER — HOSPITAL ENCOUNTER (OUTPATIENT)
Age: 55
Discharge: HOME | End: 2025-04-22
Payer: COMMERCIAL

## 2025-04-22 VITALS
OXYGEN SATURATION: 99 % | HEART RATE: 85 BPM | DIASTOLIC BLOOD PRESSURE: 68 MMHG | RESPIRATION RATE: 14 BRPM | SYSTOLIC BLOOD PRESSURE: 122 MMHG

## 2025-04-22 VITALS — BODY MASS INDEX: 30.4 KG/M2

## 2025-04-22 DIAGNOSIS — M25.511: Primary | ICD-10-CM

## 2025-04-22 DIAGNOSIS — Z96.643: ICD-10-CM

## 2025-04-22 DIAGNOSIS — Z79.899: ICD-10-CM

## 2025-04-22 PROCEDURE — 99212 OFFICE O/P EST SF 10 MIN: CPT

## 2025-04-22 PROCEDURE — G0463 HOSPITAL OUTPT CLINIC VISIT: HCPCS

## 2025-04-29 ENCOUNTER — HOSPITAL ENCOUNTER (OUTPATIENT)
Age: 55
Discharge: HOME | End: 2025-04-29
Payer: COMMERCIAL

## 2025-04-29 ENCOUNTER — HOSPITAL ENCOUNTER (OUTPATIENT)
Dept: HOSPITAL 22 - SL | Age: 55
End: 2025-04-29
Payer: COMMERCIAL

## 2025-04-29 VITALS
OXYGEN SATURATION: 98 % | HEART RATE: 70 BPM | TEMPERATURE: 97.3 F | SYSTOLIC BLOOD PRESSURE: 116 MMHG | DIASTOLIC BLOOD PRESSURE: 63 MMHG | RESPIRATION RATE: 16 BRPM

## 2025-04-29 VITALS
SYSTOLIC BLOOD PRESSURE: 131 MMHG | OXYGEN SATURATION: 97 % | HEART RATE: 79 BPM | DIASTOLIC BLOOD PRESSURE: 81 MMHG | RESPIRATION RATE: 18 BRPM

## 2025-04-29 VITALS
RESPIRATION RATE: 18 BRPM | OXYGEN SATURATION: 97 % | SYSTOLIC BLOOD PRESSURE: 131 MMHG | DIASTOLIC BLOOD PRESSURE: 81 MMHG | HEART RATE: 79 BPM

## 2025-04-29 VITALS
HEART RATE: 80 BPM | SYSTOLIC BLOOD PRESSURE: 116 MMHG | OXYGEN SATURATION: 97 % | DIASTOLIC BLOOD PRESSURE: 71 MMHG | RESPIRATION RATE: 16 BRPM

## 2025-04-29 DIAGNOSIS — Z86.73: ICD-10-CM

## 2025-04-29 DIAGNOSIS — M19.011: Primary | ICD-10-CM

## 2025-04-29 DIAGNOSIS — G89.29: ICD-10-CM

## 2025-04-29 DIAGNOSIS — R63.4: ICD-10-CM

## 2025-04-29 DIAGNOSIS — M25.511: ICD-10-CM

## 2025-04-29 DIAGNOSIS — G47.30: Primary | ICD-10-CM

## 2025-04-29 PROCEDURE — 20610 DRAIN/INJ JOINT/BURSA W/O US: CPT

## 2025-04-29 PROCEDURE — 95810 POLYSOM 6/> YRS 4/> PARAM: CPT

## 2025-05-14 ENCOUNTER — HOSPITAL ENCOUNTER (OUTPATIENT)
Age: 55
Discharge: HOME | End: 2025-05-14
Payer: COMMERCIAL

## 2025-05-14 VITALS
DIASTOLIC BLOOD PRESSURE: 87 MMHG | HEART RATE: 74 BPM | RESPIRATION RATE: 16 BRPM | SYSTOLIC BLOOD PRESSURE: 130 MMHG | BODY MASS INDEX: 29.7 KG/M2 | OXYGEN SATURATION: 98 %

## 2025-05-14 DIAGNOSIS — Z79.899: ICD-10-CM

## 2025-05-14 DIAGNOSIS — Z96.643: ICD-10-CM

## 2025-05-14 DIAGNOSIS — M70.61: Primary | ICD-10-CM

## 2025-05-14 DIAGNOSIS — Z73.89: ICD-10-CM

## 2025-05-14 DIAGNOSIS — M70.62: ICD-10-CM

## 2025-05-14 PROCEDURE — 99212 OFFICE O/P EST SF 10 MIN: CPT

## 2025-05-14 PROCEDURE — G0463 HOSPITAL OUTPT CLINIC VISIT: HCPCS

## 2025-06-02 ENCOUNTER — HOSPITAL ENCOUNTER (OUTPATIENT)
Dept: HOSPITAL 22 - RAD | Age: 55
Discharge: HOME | End: 2025-06-02
Payer: COMMERCIAL

## 2025-06-02 DIAGNOSIS — G47.33: ICD-10-CM

## 2025-06-02 DIAGNOSIS — I63.9: ICD-10-CM

## 2025-06-02 DIAGNOSIS — R10.2: ICD-10-CM

## 2025-06-02 DIAGNOSIS — I10: ICD-10-CM

## 2025-06-02 DIAGNOSIS — G47.10: Primary | ICD-10-CM

## 2025-06-02 DIAGNOSIS — Z90.710: ICD-10-CM

## 2025-06-02 LAB
TSH SERPL-ACNC: 1.41 UIU/ML (ref 0.47–4.68)
VITAMIN B12: 925 PG/ML (ref 239–931)

## 2025-06-02 PROCEDURE — 84443 ASSAY THYROID STIM HORMONE: CPT

## 2025-06-02 PROCEDURE — 36415 COLL VENOUS BLD VENIPUNCTURE: CPT

## 2025-06-02 PROCEDURE — 76830 TRANSVAGINAL US NON-OB: CPT

## 2025-06-02 PROCEDURE — 82607 VITAMIN B-12: CPT

## 2025-07-08 ENCOUNTER — HOSPITAL ENCOUNTER (OUTPATIENT)
Age: 55
Discharge: HOME | End: 2025-07-08
Payer: COMMERCIAL

## 2025-07-08 VITALS
HEART RATE: 68 BPM | OXYGEN SATURATION: 96 % | BODY MASS INDEX: 29.1 KG/M2 | DIASTOLIC BLOOD PRESSURE: 80 MMHG | SYSTOLIC BLOOD PRESSURE: 126 MMHG | RESPIRATION RATE: 18 BRPM

## 2025-07-08 VITALS
HEART RATE: 89 BPM | DIASTOLIC BLOOD PRESSURE: 75 MMHG | OXYGEN SATURATION: 98 % | SYSTOLIC BLOOD PRESSURE: 129 MMHG | RESPIRATION RATE: 18 BRPM

## 2025-07-08 VITALS
HEART RATE: 66 BPM | DIASTOLIC BLOOD PRESSURE: 73 MMHG | OXYGEN SATURATION: 99 % | RESPIRATION RATE: 16 BRPM | SYSTOLIC BLOOD PRESSURE: 111 MMHG

## 2025-07-08 DIAGNOSIS — G47.33: ICD-10-CM

## 2025-07-08 DIAGNOSIS — Z79.899: ICD-10-CM

## 2025-07-08 DIAGNOSIS — M19.071: ICD-10-CM

## 2025-07-08 DIAGNOSIS — M19.072: ICD-10-CM

## 2025-07-08 DIAGNOSIS — M70.62: Primary | ICD-10-CM

## 2025-07-08 DIAGNOSIS — E11.9: ICD-10-CM

## 2025-07-08 DIAGNOSIS — Z79.85: ICD-10-CM

## 2025-07-08 DIAGNOSIS — Z79.82: ICD-10-CM

## 2025-07-08 DIAGNOSIS — E78.5: ICD-10-CM

## 2025-07-08 PROCEDURE — 20610 DRAIN/INJ JOINT/BURSA W/O US: CPT

## 2025-07-08 PROCEDURE — 77002 NEEDLE LOCALIZATION BY XRAY: CPT

## 2025-07-31 ENCOUNTER — HOSPITAL ENCOUNTER (OUTPATIENT)
Age: 55
Discharge: HOME | End: 2025-07-31
Payer: COMMERCIAL

## 2025-07-31 VITALS
OXYGEN SATURATION: 98 % | DIASTOLIC BLOOD PRESSURE: 74 MMHG | HEART RATE: 79 BPM | SYSTOLIC BLOOD PRESSURE: 117 MMHG | RESPIRATION RATE: 14 BRPM

## 2025-07-31 DIAGNOSIS — Z79.899: ICD-10-CM

## 2025-07-31 DIAGNOSIS — M70.62: ICD-10-CM

## 2025-07-31 DIAGNOSIS — M70.61: Primary | ICD-10-CM

## 2025-07-31 DIAGNOSIS — Z79.1: ICD-10-CM

## 2025-07-31 PROCEDURE — G0463 HOSPITAL OUTPT CLINIC VISIT: HCPCS

## 2025-07-31 PROCEDURE — 99212 OFFICE O/P EST SF 10 MIN: CPT

## (undated) DEVICE — PATIENT RETURN ELECTRODE, SINGLE-USE, CONTACT QUALITY MONITORING, ADULT, WITH 9FT CORD, FOR PATIENTS WEIGING OVER 33LBS. (15KG): Brand: MEGADYNE

## (undated) DEVICE — TIP COVER ACCESSORY

## (undated) DEVICE — PK UROL DAVINCI 10

## (undated) DEVICE — LEGGINGS, PAIR, 29X43, STERILE: Brand: MEDLINE

## (undated) DEVICE — APPL CHLORAPREP TINTED 26ML TEAL

## (undated) DEVICE — DRAPE,UNDERBUTTOCKS,STERILE: Brand: MEDLINE

## (undated) DEVICE — TOTAL TRAY, 16FR 10ML SIL FOLEY, URN: Brand: MEDLINE

## (undated) DEVICE — GLV SURG BIOGEL LTX PF 7 1/2

## (undated) DEVICE — TRENDELENBURG WINGPAD POSITIONING KIT DELUXE - WITHOUT BODY STRAP: Brand: SOULE MEDICAL

## (undated) DEVICE — ADHS SKIN PREMIERPRO EXOFIN TOPICAL HI/VISC .5ML

## (undated) DEVICE — ARM DRAPE

## (undated) DEVICE — 3M™ IOBAN™ 2 ANTIMICROBIAL INCISE DRAPE 6650EZ: Brand: IOBAN™ 2

## (undated) DEVICE — CANNULA SEAL

## (undated) DEVICE — BLANKT WARM UPPR/BDY ARM/OUT 57X196CM

## (undated) DEVICE — COLUMN DRAPE

## (undated) DEVICE — DRAPE,TOP,102X53,STERILE: Brand: MEDLINE

## (undated) DEVICE — ST TBG CONN PNEUMOCLEAR EVAC SMOKE HEAT/HUMID

## (undated) DEVICE — SUT MNCRYL PLS ANTIB UD 4/0 PS2 18IN

## (undated) DEVICE — SUT SILK 2/0 SH 30IN K833H

## (undated) DEVICE — BLADELESS OBTURATOR: Brand: WECK VISTA